# Patient Record
Sex: MALE | Race: WHITE
[De-identification: names, ages, dates, MRNs, and addresses within clinical notes are randomized per-mention and may not be internally consistent; named-entity substitution may affect disease eponyms.]

---
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
## 2019-02-15 ENCOUNTER — OUTPATIENT (OUTPATIENT)
Dept: OUTPATIENT SERVICES | Facility: HOSPITAL | Age: 19
LOS: 1 days | Discharge: HOME | End: 2019-02-15

## 2019-02-15 VITALS
HEART RATE: 98 BPM | RESPIRATION RATE: 17 BRPM | OXYGEN SATURATION: 99 % | SYSTOLIC BLOOD PRESSURE: 125 MMHG | HEIGHT: 66 IN | DIASTOLIC BLOOD PRESSURE: 75 MMHG | TEMPERATURE: 97 F | WEIGHT: 171.96 LBS

## 2019-02-15 DIAGNOSIS — Z01.818 ENCOUNTER FOR OTHER PREPROCEDURAL EXAMINATION: ICD-10-CM

## 2019-02-15 DIAGNOSIS — Z01.818 ENCOUNTER FOR OTHER PREPROCEDURAL EXAMINATION: Chronic | ICD-10-CM

## 2019-02-15 DIAGNOSIS — K02.9 DENTAL CARIES, UNSPECIFIED: ICD-10-CM

## 2019-02-15 NOTE — H&P PST ADULT - PMH
Autism  spectrum  Development delay    GERD (gastroesophageal reflux disease)    Hypothyroidism    Seizures  last 2014

## 2019-02-15 NOTE — H&P PST ADULT - NSANTHOSAYNRD_GEN_A_CORE
No. EUGENE screening performed.  STOP BANG Legend: 0-2 = LOW Risk; 3-4 = INTERMEDIATE Risk; 5-8 = HIGH Risk

## 2019-02-15 NOTE — H&P PST ADULT - REASON FOR ADMISSION
18  yr old  nonverbal autistic male. Pt accompanied with mother and father , Harper and Parish Shaikh. PT has some dental caries and needs cleaning. pt uncooperative in dental chair due to mentall disabled autistic young man. Parents have elected to complete oral rehabilitation under gerneral anesthesia with Dr. You 02/27/19

## 2019-02-15 NOTE — H&P PST ADULT - HISTORY OF PRESENT ILLNESS
18  yr old  nonverbal autistic male. Pt accompanied with mother and father , Shaina and Parish Shaikh. PT has some dental caries and needs cleaning. pt uncooperative in dental chair due to mentally disabled autistic young man. Parents have elected to complete oral rehabilitation under general anesthesia.  This young man is nonverbal, only makes grunting sounds, and arm movements. He walks independently. He has a male twin that has no developmental issues.   IT was noted after age 1 he started to regress. remained nonverbal   He is not very cooperative. Only opened mouth for a few seconds to assess  throat and dental status. He plays with a bag of pencils which keeps him busy. He is in constant motion  He hits himself in head and face intermittently  All health information was obtained from both parents and [employer] labs

## 2019-02-20 ENCOUNTER — LABORATORY RESULT (OUTPATIENT)
Age: 19
End: 2019-02-20

## 2019-02-20 ENCOUNTER — OUTPATIENT (OUTPATIENT)
Dept: OUTPATIENT SERVICES | Facility: HOSPITAL | Age: 19
LOS: 1 days | Discharge: HOME | End: 2019-02-20

## 2019-02-20 DIAGNOSIS — Z01.818 ENCOUNTER FOR OTHER PREPROCEDURAL EXAMINATION: Chronic | ICD-10-CM

## 2019-02-20 DIAGNOSIS — K21.0 GASTRO-ESOPHAGEAL REFLUX DISEASE WITH ESOPHAGITIS: ICD-10-CM

## 2019-02-20 DIAGNOSIS — F84.0 AUTISTIC DISORDER: ICD-10-CM

## 2019-02-20 DIAGNOSIS — D50.9 IRON DEFICIENCY ANEMIA, UNSPECIFIED: ICD-10-CM

## 2019-02-20 PROBLEM — E03.9 HYPOTHYROIDISM, UNSPECIFIED: Chronic | Status: ACTIVE | Noted: 2019-02-15

## 2019-02-20 PROBLEM — K21.9 GASTRO-ESOPHAGEAL REFLUX DISEASE WITHOUT ESOPHAGITIS: Chronic | Status: ACTIVE | Noted: 2019-02-15

## 2019-02-20 PROBLEM — R56.9 UNSPECIFIED CONVULSIONS: Chronic | Status: ACTIVE | Noted: 2019-02-15

## 2019-02-20 PROBLEM — R62.50 UNSPECIFIED LACK OF EXPECTED NORMAL PHYSIOLOGICAL DEVELOPMENT IN CHILDHOOD: Chronic | Status: ACTIVE | Noted: 2019-02-15

## 2019-02-27 ENCOUNTER — OUTPATIENT (OUTPATIENT)
Dept: OUTPATIENT SERVICES | Facility: HOSPITAL | Age: 19
LOS: 1 days | Discharge: HOME | End: 2019-02-27

## 2019-02-27 VITALS
HEART RATE: 88 BPM | DIASTOLIC BLOOD PRESSURE: 56 MMHG | OXYGEN SATURATION: 97 % | SYSTOLIC BLOOD PRESSURE: 106 MMHG | RESPIRATION RATE: 18 BRPM

## 2019-02-27 VITALS
SYSTOLIC BLOOD PRESSURE: 139 MMHG | WEIGHT: 171.96 LBS | OXYGEN SATURATION: 99 % | TEMPERATURE: 98 F | RESPIRATION RATE: 18 BRPM | HEIGHT: 66 IN | DIASTOLIC BLOOD PRESSURE: 76 MMHG | HEART RATE: 90 BPM

## 2019-02-27 DIAGNOSIS — Z01.818 ENCOUNTER FOR OTHER PREPROCEDURAL EXAMINATION: Chronic | ICD-10-CM

## 2019-02-27 RX ORDER — SODIUM CHLORIDE 9 MG/ML
1000 INJECTION, SOLUTION INTRAVENOUS
Qty: 0 | Refills: 0 | Status: DISCONTINUED | OUTPATIENT
Start: 2019-02-27 | End: 2019-03-14

## 2019-02-27 RX ORDER — ONDANSETRON 8 MG/1
4 TABLET, FILM COATED ORAL ONCE
Qty: 0 | Refills: 0 | Status: DISCONTINUED | OUTPATIENT
Start: 2019-02-27 | End: 2019-03-14

## 2019-02-27 RX ORDER — MORPHINE SULFATE 50 MG/1
2 CAPSULE, EXTENDED RELEASE ORAL
Qty: 0 | Refills: 0 | Status: DISCONTINUED | OUTPATIENT
Start: 2019-02-27 | End: 2019-02-27

## 2019-02-27 RX ADMIN — SODIUM CHLORIDE 100 MILLILITER(S): 9 INJECTION, SOLUTION INTRAVENOUS at 09:29

## 2019-02-27 NOTE — CHART NOTE - NSCHARTNOTEFT_GEN_A_CORE
PACU ANESTHESIA ADMISSION NOTE      Procedure: Dental rehabilitation in adult    Post op diagnosis:  Dental calculus      ____  Intubated  TV:______       Rate: ______      FiO2: ______    _x___  Patent Airway    _x___  Full return of protective reflexes    _x___  Full recovery from anesthesia / back to baseline status    Vitals:  T(C): 35.9 (02-27-19 @ 09:29), Max: 36.7 (02-27-19 @ 06:40)  HR: 88 (02-27-19 @ 11:02) (77 - 90)  BP: 107/57 (02-27-19 @ 11:02) (95/53 - 139/76)  RR: 18 (02-27-19 @ 11:02) (14 - 19)  SpO2: 96% (02-27-19 @ 11:02) (96% - 100%)    Mental Status:  _x___ Awake   _____ Alert   _____ Drowsy   _____ Sedated    Nausea/Vomiting:  _x___  NO       ______Yes,   See Post - Op Orders         Pain Scale (0-10):  __0___    Treatment: _x___ None    ____ See Post - Op/PCA Orders    Post - Operative Fluids:   __x__ Oral   ____ See Post - Op Orders    Plan: Discharge:   _x___Home       _____Floor     _____Critical Care    _____  Other:_________________    Comments:  No anesthesia issues or complications noted.  Discharge when criteria met.

## 2019-02-27 NOTE — ASU DISCHARGE PLAN (ADULT/PEDIATRIC). - ITEMS TO FOLLOWUP WITH YOUR PHYSICIAN'S
In case of emergency please call [phone] or [phone] and ask to page dental resident on call.

## 2019-02-27 NOTE — ASU DISCHARGE PLAN (ADULT/PEDIATRIC). - NOTIFY
Inability to Tolerate Liquids or Foods/Pain not relieved by Medications/Bleeding that does not stop/Fever greater than 101/Swelling that continues

## 2019-02-27 NOTE — BRIEF OPERATIVE NOTE - PROCEDURE
<<-----Click on this checkbox to enter Procedure Dental rehabilitation in adult  02/27/2019    Active  [facility]

## 2019-02-27 NOTE — PRE-ANESTHESIA EVALUATION ADULT - NSANTHOSAYNRD_GEN_A_CORE
N/A/No. EUGENE screening performed.  STOP BANG Legend: 0-2 = LOW Risk; 3-4 = INTERMEDIATE Risk; 5-8 = HIGH Risk

## 2019-03-05 DIAGNOSIS — F84.0 AUTISTIC DISORDER: ICD-10-CM

## 2019-03-05 DIAGNOSIS — K02.9 DENTAL CARIES, UNSPECIFIED: ICD-10-CM

## 2019-03-05 DIAGNOSIS — E03.9 HYPOTHYROIDISM, UNSPECIFIED: ICD-10-CM

## 2019-03-05 DIAGNOSIS — R56.9 UNSPECIFIED CONVULSIONS: ICD-10-CM

## 2019-03-05 DIAGNOSIS — R62.50 UNSPECIFIED LACK OF EXPECTED NORMAL PHYSIOLOGICAL DEVELOPMENT IN CHILDHOOD: ICD-10-CM

## 2019-03-11 ENCOUNTER — LABORATORY RESULT (OUTPATIENT)
Age: 19
End: 2019-03-11

## 2019-03-11 ENCOUNTER — APPOINTMENT (OUTPATIENT)
Dept: PEDIATRIC HEMATOLOGY/ONCOLOGY | Facility: CLINIC | Age: 19
End: 2019-03-11

## 2019-03-11 VITALS — SYSTOLIC BLOOD PRESSURE: 126 MMHG | DIASTOLIC BLOOD PRESSURE: 70 MMHG | RESPIRATION RATE: 20 BRPM | HEART RATE: 100 BPM

## 2019-03-11 VITALS — TEMPERATURE: 98.8 F

## 2019-03-11 RX ORDER — IRON SUCROSE 20 MG/ML
200 INJECTION, SOLUTION INTRAVENOUS ONCE
Qty: 0 | Refills: 0 | Status: COMPLETED | OUTPATIENT
Start: 2019-03-11 | End: 2019-03-11

## 2019-03-11 RX ADMIN — Medication 0.5 MILLIGRAM(S): at 09:50

## 2019-03-11 RX ADMIN — IRON SUCROSE 200 MILLIGRAM(S): 20 INJECTION, SOLUTION INTRAVENOUS at 11:00

## 2019-03-11 RX ADMIN — IRON SUCROSE 210 MILLIGRAM(S): 20 INJECTION, SOLUTION INTRAVENOUS at 10:00

## 2019-03-11 NOTE — REASON FOR VISIT
[Follow-Up Visit] : a follow-up visit for [Iron Deficiency Anemia] : iron deficiency anemia [Mother] : mother

## 2019-03-11 NOTE — PHYSICAL EXAM
[Pallor] : no pallor [Icterus] : not icterus [Normal] : affect appropriate [de-identified] : autistic;  [de-identified] : nonverbal

## 2019-03-12 LAB
ALBUMIN SERPL ELPH-MCNC: 4.7 G/DL
ALP BLD-CCNC: 142 U/L
ALT SERPL-CCNC: 9 U/L
ANION GAP SERPL CALC-SCNC: 13 MMOL/L
AST SERPL-CCNC: 12 U/L
BILIRUB SERPL-MCNC: 0.3 MG/DL
BUN SERPL-MCNC: 13 MG/DL
CALCIUM SERPL-MCNC: 9.9 MG/DL
CHLORIDE SERPL-SCNC: 104 MMOL/L
CO2 SERPL-SCNC: 25 MMOL/L
CREAT SERPL-MCNC: 0.6 MG/DL
ERYTHROCYTE [SEDIMENTATION RATE] IN BLOOD BY WESTERGREN METHOD: 1 MM/HR
FERRITIN SERPL-MCNC: 13 NG/ML
GLUCOSE SERPL-MCNC: 95 MG/DL
HCT VFR BLD CALC: 33.4 %
HGB BLD-MCNC: 10.1 G/DL
IRON SATN MFR SERPL: 8 %
IRON SERPL-MCNC: 37 UG/DL
MCHC RBC-ENTMCNC: 20.6 PG
MCHC RBC-ENTMCNC: 30.2 G/DL
MCV RBC AUTO: 68 FL
PLATELET # BLD AUTO: 303 K/UL
PMV BLD: 10.6 FL
POTASSIUM SERPL-SCNC: 4.3 MMOL/L
PROT SERPL-MCNC: 7.1 G/DL
RBC # BLD: 4.91 M/UL
RBC # FLD: 16.6 %
RETICS # AUTO: 2.3 %
RETICS AGGREG/RBC NFR: 113.4 K/UL
SODIUM SERPL-SCNC: 142 MMOL/L
TIBC SERPL-MCNC: 459 UG/DL
UIBC SERPL-MCNC: 422 UG/DL
WBC # FLD AUTO: 3.5 K/UL

## 2019-03-26 ENCOUNTER — LABORATORY RESULT (OUTPATIENT)
Age: 19
End: 2019-03-26

## 2019-03-26 ENCOUNTER — APPOINTMENT (OUTPATIENT)
Dept: PEDIATRIC HEMATOLOGY/ONCOLOGY | Facility: CLINIC | Age: 19
End: 2019-03-26

## 2019-03-26 VITALS
DIASTOLIC BLOOD PRESSURE: 72 MMHG | HEART RATE: 117 BPM | TEMPERATURE: 97.4 F | RESPIRATION RATE: 22 BRPM | SYSTOLIC BLOOD PRESSURE: 129 MMHG

## 2019-03-26 RX ORDER — IRON SUCROSE 20 MG/ML
200 INJECTION, SOLUTION INTRAVENOUS ONCE
Qty: 0 | Refills: 0 | Status: COMPLETED | OUTPATIENT
Start: 2019-03-26 | End: 2019-03-26

## 2019-03-26 RX ADMIN — IRON SUCROSE 210 MILLIGRAM(S): 20 INJECTION, SOLUTION INTRAVENOUS at 09:57

## 2019-03-26 RX ADMIN — Medication 0.5 MILLIGRAM(S): at 09:30

## 2019-03-26 RX ADMIN — IRON SUCROSE 200 MILLIGRAM(S): 20 INJECTION, SOLUTION INTRAVENOUS at 11:00

## 2019-03-26 NOTE — HISTORY OF PRESENT ILLNESS
[de-identified] : 17 y/o male with autism, beta thal minor, hypothyroidism, and gastritis/esophagitis.  Here for scheduled visit for Venofer infusion.  currently taking ferrous sulfate twice a day, and mother states she has started a multivitamin with iron daily.   States Cory was seen by GI recently for delayed gastric emptying and was started on Erythromycin for 15 days, and continuing Famotidine 30mg twice daily and Omeprazole 20mg in am and 40mg in pm.   Mother states since last visit with us, Cory had vomited about 4 times, no coffee ground emesis or dark colored emesis noted.  No other concerns at present time.

## 2019-03-28 LAB
HCT VFR BLD CALC: 36.6 %
HGB BLD-MCNC: 10.8 G/DL
MCHC RBC-ENTMCNC: 20.1 PG
MCHC RBC-ENTMCNC: 29.5 G/DL
MCV RBC AUTO: 68.3 FL
PLATELET # BLD AUTO: 291 K/UL
PMV BLD: 11.2 FL
RBC # BLD: 5.36 M/UL
RBC # FLD: 16.4 %
RETICS # AUTO: 1.1 %
RETICS AGGREG/RBC NFR: 56.8 K/UL
WBC # FLD AUTO: 3.74 K/UL

## 2019-04-17 ENCOUNTER — LABORATORY RESULT (OUTPATIENT)
Age: 19
End: 2019-04-17

## 2019-04-17 ENCOUNTER — OUTPATIENT (OUTPATIENT)
Dept: OUTPATIENT SERVICES | Facility: HOSPITAL | Age: 19
LOS: 1 days | Discharge: HOME | End: 2019-04-17

## 2019-04-17 DIAGNOSIS — D50.9 IRON DEFICIENCY ANEMIA, UNSPECIFIED: ICD-10-CM

## 2019-04-17 DIAGNOSIS — Z01.818 ENCOUNTER FOR OTHER PREPROCEDURAL EXAMINATION: Chronic | ICD-10-CM

## 2019-06-17 RX ORDER — LEVOTHYROXINE SODIUM 0.05 MG/1
50 TABLET ORAL
Qty: 30 | Refills: 0 | Status: DISCONTINUED | COMMUNITY
Start: 2017-06-13 | End: 2019-06-17

## 2019-06-21 ENCOUNTER — OUTPATIENT (OUTPATIENT)
Dept: OUTPATIENT SERVICES | Facility: HOSPITAL | Age: 19
LOS: 1 days | Discharge: HOME | End: 2019-06-21

## 2019-06-21 DIAGNOSIS — F84.0 AUTISTIC DISORDER: ICD-10-CM

## 2019-06-21 DIAGNOSIS — Z01.818 ENCOUNTER FOR OTHER PREPROCEDURAL EXAMINATION: Chronic | ICD-10-CM

## 2019-06-21 DIAGNOSIS — E30.0 DELAYED PUBERTY: ICD-10-CM

## 2019-06-21 DIAGNOSIS — E03.9 HYPOTHYROIDISM, UNSPECIFIED: ICD-10-CM

## 2019-06-21 DIAGNOSIS — G40.109 LOCALIZATION-RELATED (FOCAL) (PARTIAL) SYMPTOMATIC EPILEPSY AND EPILEPTIC SYNDROMES WITH SIMPLE PARTIAL SEIZURES, NOT INTRACTABLE, WITHOUT STATUS EPILEPTICUS: ICD-10-CM

## 2019-06-25 ENCOUNTER — APPOINTMENT (OUTPATIENT)
Dept: PEDIATRIC ENDOCRINOLOGY | Facility: CLINIC | Age: 19
End: 2019-06-25

## 2019-07-02 ENCOUNTER — APPOINTMENT (OUTPATIENT)
Dept: PEDIATRIC ENDOCRINOLOGY | Facility: CLINIC | Age: 19
End: 2019-07-02
Payer: COMMERCIAL

## 2019-07-02 VITALS — BODY MASS INDEX: 26.02 KG/M2 | WEIGHT: 175.71 LBS | HEIGHT: 68.9 IN

## 2019-07-02 DIAGNOSIS — Z86.39 PERSONAL HISTORY OF OTHER ENDOCRINE, NUTRITIONAL AND METABOLIC DISEASE: ICD-10-CM

## 2019-07-02 PROCEDURE — 99214 OFFICE O/P EST MOD 30 MIN: CPT

## 2019-07-02 RX ORDER — OLANZAPINE 5 MG/1
5 TABLET, FILM COATED ORAL
Qty: 60 | Refills: 0 | Status: DISCONTINUED | COMMUNITY
Start: 2017-02-13 | End: 2019-07-02

## 2019-07-02 RX ORDER — LORATADINE 5 MG
17 TABLET,CHEWABLE ORAL
Refills: 0 | Status: ACTIVE | COMMUNITY

## 2019-07-02 RX ORDER — ATOMOXETINE HYDROCHLORIDE 60 MG/1
60 CAPSULE ORAL
Qty: 30 | Refills: 0 | Status: DISCONTINUED | COMMUNITY
Start: 2016-11-01 | End: 2019-07-02

## 2019-07-02 RX ORDER — ATOMOXETINE HYDROCHLORIDE 25 MG/1
25 CAPSULE ORAL
Qty: 60 | Refills: 0 | Status: DISCONTINUED | COMMUNITY
Start: 2017-02-13 | End: 2019-07-02

## 2019-07-02 NOTE — HISTORY OF PRESENT ILLNESS
[FreeTextEntry2] : Cory is here for follow-up of primary hypothyroidism.  On current dose since 10/2017.  He is taking his T4 daily, 5 am,  from all other medications.  Constipation controlled with miralax.  Good energy.  Sleeping well, good appetite.\par \par Pubertal delay.  He is noted to have progressed.  \par \par Hx foot fracture L few years ago, parents report mention of osteopenia on xray.  Were unsuccessful doing spine xray.  Vitamin D deficiency being treated, taking daily supplements with calcium and vitamin D.  No other fractures.  No back pain.  \par \par History of recurrent hematemesis, felt due to gastritis, acid reflux, esophagitis, delayed gastric emptying.  Being followed by GI Dr. Alba. Recurrent Fe deficiency anemia. s/p Fe infusion.  Getting iron supplement. (Also has thallasemia trait)\par \par GI: gastritis,

## 2019-07-02 NOTE — DISCUSSION/SUMMARY
[FreeTextEntry1] : Cory has primary hypothyroidism on thyroid hormone replacement therapy.  Levels at this time are borderline.  We have increased dose.  Level to be reassessed in 3  months.\par \par Cory had significantly delayed puberty but subsequently had spontaneous puberty at 12y with appropriate albeit slow progression.  He is in the late stages of puberty, thus no longer of concern.\par \par Finally, there is history of xray evidence of osteopenia and also vitamin D insufficiency due to restricted diet.  He is on vitamin D supplement as well as multivitamin with calcium supplementation.  Vitamin D level has improved, to continue supplementation. They are aware of the importance of supplementation due to his very limited diet. He is further at risk for osteoporosis as a result of chronic depakote therapy.  Additionally delayed puberty can result in decreased bone density, however this is no longer an issue.  We may consider DXA scan if any fracture were to occur.

## 2019-07-02 NOTE — CONSULT LETTER
[Dear  ___] : Dear  [unfilled], [Please see my note below.] : Please see my note below. [Courtesy Letter:] : I had the pleasure of seeing your patient, [unfilled], in my office today. [Consult Closing:] : Thank you very much for allowing me to participate in the care of this patient.  If you have any questions, please do not hesitate to contact me. [Sincerely,] : Sincerely, [FreeTextEntry3] : Lexie Jackson MD\par Pediatric Endocrinology\par [facility]\par [facility]\par

## 2019-07-02 NOTE — DATA REVIEWED
[FreeTextEntry1] : Date: 11/28/2018   TSH ([facility]:[id number] quest:[id number]): 3.83 UIU/mL (Normal)    25-OH Vitamin D (quest:[id number] [facility]:[id number]): 14 ng/mL (Low)    Free T4 ([facility]:[id number] quest:[id number]): 1.0 ng/dL (Normal)    \par Date: 6/21/2019   CBC with manual diff: Hgb 9.8  (Abnormal)    25-OH Vitamin D (quest:[id number] [facility]:[id number]): 21 ng/mL (Abnormal)    TSH ([facility]:[id number] quest:[id number]): 4.02 UIU/mL (High Normal)    Free T4 ([facility]:[id number] quest:[id number]): 0.9 ng/dL (Low Normal)    \par

## 2019-07-02 NOTE — PHYSICAL EXAM
[Healthy Appearing] : healthy appearing [Well Nourished] : well nourished [Normal Appearance] : normal appearance [Normal S1 and S2] : normal S1 and S2 [Abdomen Soft] : soft [Clear to Ausculation Bilaterally] : clear to auscultation bilaterally [Abdomen Tenderness] : non-tender [] : no hepatosplenomegaly [5] : was Shaji stage 5 [Testes] : normal [___] : [unfilled] [Normal] : normal  [Dysmorphic] : non-dysmorphic [Murmur] : no murmurs [Goiter] : no goiter [de-identified] : noncommunicative [de-identified] : n

## 2019-07-15 ENCOUNTER — RX RENEWAL (OUTPATIENT)
Age: 19
End: 2019-07-15

## 2019-07-24 ENCOUNTER — OUTPATIENT (OUTPATIENT)
Dept: OUTPATIENT SERVICES | Facility: HOSPITAL | Age: 19
LOS: 1 days | Discharge: HOME | End: 2019-07-24

## 2019-07-24 ENCOUNTER — LABORATORY RESULT (OUTPATIENT)
Age: 19
End: 2019-07-24

## 2019-07-24 ENCOUNTER — APPOINTMENT (OUTPATIENT)
Dept: PEDIATRIC HEMATOLOGY/ONCOLOGY | Facility: CLINIC | Age: 19
End: 2019-07-24
Payer: COMMERCIAL

## 2019-07-24 VITALS
TEMPERATURE: 97.3 F | WEIGHT: 172 LBS | HEART RATE: 96 BPM | RESPIRATION RATE: 20 BRPM | DIASTOLIC BLOOD PRESSURE: 73 MMHG | SYSTOLIC BLOOD PRESSURE: 128 MMHG

## 2019-07-24 DIAGNOSIS — E03.9 HYPOTHYROIDISM, UNSPECIFIED: ICD-10-CM

## 2019-07-24 DIAGNOSIS — K21.0 GASTRO-ESOPHAGEAL REFLUX DISEASE WITH ESOPHAGITIS: ICD-10-CM

## 2019-07-24 DIAGNOSIS — F84.0 AUTISTIC DISORDER: ICD-10-CM

## 2019-07-24 DIAGNOSIS — D50.9 IRON DEFICIENCY ANEMIA, UNSPECIFIED: ICD-10-CM

## 2019-07-24 DIAGNOSIS — Z01.818 ENCOUNTER FOR OTHER PREPROCEDURAL EXAMINATION: Chronic | ICD-10-CM

## 2019-07-24 DIAGNOSIS — K29.71 GASTRITIS, UNSPECIFIED, WITH BLEEDING: ICD-10-CM

## 2019-07-24 LAB
HCT VFR BLD CALC: 35.7 %
HGB BLD-MCNC: 10.7 G/DL
MCHC RBC-ENTMCNC: 20.1 PG
MCHC RBC-ENTMCNC: 30 G/DL
MCV RBC AUTO: 67.1 FL
PLATELET # BLD AUTO: 293 K/UL
PMV BLD: 10 FL
RBC # BLD: 5.32 M/UL
RBC # FLD: 18.8 %
RETICS # AUTO: 1.7 %
RETICS AGGREG/RBC NFR: 89.9 K/UL
WBC # FLD AUTO: 3.54 K/UL

## 2019-07-24 PROCEDURE — 99214 OFFICE O/P EST MOD 30 MIN: CPT

## 2019-07-24 RX ORDER — IRON SUCROSE 20 MG/ML
200 INJECTION, SOLUTION INTRAVENOUS ONCE
Refills: 0 | Status: COMPLETED | OUTPATIENT
Start: 2019-07-24 | End: 2019-07-24

## 2019-07-24 RX ADMIN — IRON SUCROSE 110 MILLIGRAM(S): 20 INJECTION, SOLUTION INTRAVENOUS at 10:30

## 2019-07-24 RX ADMIN — IRON SUCROSE 200 MILLIGRAM(S): 20 INJECTION, SOLUTION INTRAVENOUS at 11:45

## 2019-07-24 RX ADMIN — Medication 0.5 MILLIGRAM(S): at 10:15

## 2019-07-24 NOTE — PHYSICAL EXAM
[Normal] : affect appropriate [Icterus] : not icterus [Pallor] : no pallor [de-identified] : autistic;  [de-identified] : fe scratches on left upper chest/shoulder [de-identified] : nonverbal

## 2019-07-24 NOTE — HISTORY OF PRESENT ILLNESS
[de-identified] : 19 yo male with autism, beta thal minor, hypothyroidism, and gastritis/esophagitis. He continues on ferrous sulfate bid over the last ~6 months and PPI/H2 blocker from GI.  He has had multiple episodes of emesis since last visit with at least 5 episodes of coffee ground emesis recently.  Mother believes its related to less control over diet and eating later at night over the summer due to change in schedule and being off from school.  Mother trying to keep him adhering to his usual routine.\par \par Labs checked oupt last month and Ferritin last month had trended down to 15.  Hgb then was 9.8.  Returns today for re-evalution and possible venofer. [de-identified] : good energ, no recent fevers or illnesses

## 2019-07-24 NOTE — REVIEW OF SYSTEMS
[Bleeding] : bleeding [Anemia] : anemia [Emesis] : emesis [Hematemesis] : hematemesis [Negative] : Allergic/Immunologic

## 2019-07-31 ENCOUNTER — APPOINTMENT (OUTPATIENT)
Dept: PEDIATRIC HEMATOLOGY/ONCOLOGY | Facility: CLINIC | Age: 19
End: 2019-07-31
Payer: COMMERCIAL

## 2019-07-31 ENCOUNTER — LABORATORY RESULT (OUTPATIENT)
Age: 19
End: 2019-07-31

## 2019-07-31 VITALS
SYSTOLIC BLOOD PRESSURE: 134 MMHG | TEMPERATURE: 98.6 F | DIASTOLIC BLOOD PRESSURE: 69 MMHG | RESPIRATION RATE: 20 BRPM | HEART RATE: 107 BPM

## 2019-07-31 LAB
HCT VFR BLD CALC: 31.8 %
HGB BLD-MCNC: 9.5 G/DL
MCHC RBC-ENTMCNC: 20.5 PG
MCHC RBC-ENTMCNC: 29.9 G/DL
MCV RBC AUTO: 68.5 FL
PLATELET # BLD AUTO: 220 K/UL
PMV BLD: 10.5 FL
RBC # BLD: 4.64 M/UL
RBC # FLD: 18.7 %
RETICS # AUTO: 1.9 %
RETICS AGGREG/RBC NFR: 86.8 K/UL
WBC # FLD AUTO: 3.93 K/UL

## 2019-07-31 PROCEDURE — 99214 OFFICE O/P EST MOD 30 MIN: CPT

## 2019-07-31 RX ORDER — IRON SUCROSE 20 MG/ML
200 INJECTION, SOLUTION INTRAVENOUS ONCE
Refills: 0 | Status: COMPLETED | OUTPATIENT
Start: 2019-07-31 | End: 2019-07-31

## 2019-07-31 RX ADMIN — IRON SUCROSE 210 MILLIGRAM(S): 20 INJECTION, SOLUTION INTRAVENOUS at 09:30

## 2019-07-31 RX ADMIN — IRON SUCROSE 200 MILLIGRAM(S): 20 INJECTION, SOLUTION INTRAVENOUS at 10:45

## 2019-07-31 RX ADMIN — Medication 0.5 MILLIGRAM(S): at 09:20

## 2019-07-31 NOTE — HISTORY OF PRESENT ILLNESS
[de-identified] : This is a 20 y/o male with autism, beta thal minor, hypothyroidism, gastritis/esophagitis.  Here for scheduled visit for Venofer infusion.  Mother states that Cory has been doing well since visit for venofer infusion  last week.  Did have 3 episodes of clear colored emesis this past week.  Otherwise, no other issues at present time.  Denies any recent fever, cough or cold symptoms.  No unusual bruising or bleeding.   Patient has been compliant with iron supplements.  Taking Ompeprazole/Famotidine daily.   Scheduled to see GI in August.

## 2019-07-31 NOTE — REVIEW OF SYSTEMS
[Normal Appetite] : normal appetite [Emesis] : emesis [Negative] : Psychiatric [Fever] : no fever [Rash] : no rash [Fatigue] : no fatigue [Ecchymoses] : no ecchymoses [Petechiae] : no petechiae [Jaundice] : no jaundice [Icterus] : no icterus [Epistaxis] : no epistaxis [Eye Discharge] : no eye discharge [Mouth Ulcers] : no mouth ulcers [Bleeding] : no bleeding [Pallor] : no pallor [Adenopathy] : no adenopathy [Bruising] : no bruising [Dyspnea] : no dyspnea [Cough] : no cough [Palpitations] : no palpitations [Murmur] : no murmur [Hematuria] : no hematuria [Hematemesis] : no hematemesis [Erythema] : no erythema [Joint Swelling] : no joint swelling [Seizure] : no seizure

## 2019-08-01 LAB
ALBUMIN SERPL ELPH-MCNC: 4.6 G/DL
ALP BLD-CCNC: 142 U/L
ALT SERPL-CCNC: 14 U/L
ANION GAP SERPL CALC-SCNC: 16 MMOL/L
AST SERPL-CCNC: 26 U/L
BILIRUB SERPL-MCNC: 0.3 MG/DL
BUN SERPL-MCNC: 12 MG/DL
CALCIUM SERPL-MCNC: 10.2 MG/DL
CHLORIDE SERPL-SCNC: 102 MMOL/L
CO2 SERPL-SCNC: 23 MMOL/L
CREAT SERPL-MCNC: 0.8 MG/DL
ERYTHROCYTE [SEDIMENTATION RATE] IN BLOOD BY WESTERGREN METHOD: 1 MM/HR
FERRITIN SERPL-MCNC: 22 NG/ML
GLUCOSE SERPL-MCNC: 82 MG/DL
IRON SATN MFR SERPL: 19 %
IRON SERPL-MCNC: 89 UG/DL
POTASSIUM SERPL-SCNC: 5.1 MMOL/L
PROT SERPL-MCNC: 7.5 G/DL
SODIUM SERPL-SCNC: 141 MMOL/L
STFR SERPL-MCNC: 6 MG/L
TIBC SERPL-MCNC: 476 UG/DL
UIBC SERPL-MCNC: 387 UG/DL

## 2019-08-13 ENCOUNTER — APPOINTMENT (OUTPATIENT)
Dept: PEDIATRIC HEMATOLOGY/ONCOLOGY | Facility: CLINIC | Age: 19
End: 2019-08-13
Payer: COMMERCIAL

## 2019-08-13 ENCOUNTER — LABORATORY RESULT (OUTPATIENT)
Age: 19
End: 2019-08-13

## 2019-08-13 VITALS
SYSTOLIC BLOOD PRESSURE: 134 MMHG | HEART RATE: 100 BPM | DIASTOLIC BLOOD PRESSURE: 68 MMHG | RESPIRATION RATE: 20 BRPM | TEMPERATURE: 97.4 F

## 2019-08-13 PROCEDURE — 96365 THER/PROPH/DIAG IV INF INIT: CPT

## 2019-08-13 RX ORDER — IRON SUCROSE 20 MG/ML
200 INJECTION, SOLUTION INTRAVENOUS ONCE
Refills: 0 | Status: COMPLETED | OUTPATIENT
Start: 2019-08-13 | End: 2019-08-13

## 2019-08-13 RX ADMIN — IRON SUCROSE 200 MILLIGRAM(S): 20 INJECTION, SOLUTION INTRAVENOUS at 10:45

## 2019-08-13 RX ADMIN — IRON SUCROSE 210 MILLIGRAM(S): 20 INJECTION, SOLUTION INTRAVENOUS at 09:37

## 2019-08-13 RX ADMIN — Medication 0.5 MILLIGRAM(S): at 09:25

## 2019-08-13 NOTE — REVIEW OF SYSTEMS
[Normal Appetite] : normal appetite [Emesis] : emesis [Negative] : Psychiatric [Fever] : no fever [Fatigue] : no fatigue [Rash] : no rash [Petechiae] : no petechiae [Ecchymoses] : no ecchymoses [Jaundice] : no jaundice [Icterus] : no icterus [Otitis Media] : no otitis media [Nasal Discharge] : no nasal discharge [Sore Throat] : no sore throat [Pallor] : no pallor [Bleeding] : no bleeding [Bruising] : no bruising [Adenopathy] : no adenopathy [Cough] : no cough [Abdominal Pain] : no abdominal pain [Nausea] : no nausea [Hematuria] : no hematuria [Hematemesis] : no hematemesis [Constipation] : no constipation [Headache] : no headache

## 2019-08-13 NOTE — HISTORY OF PRESENT ILLNESS
[de-identified] : This is a 18 y/o male with autism, iron deficiency anemia, beta thal minor, hypothyroidism, gastritis/esophagitis.  Here today for scheduled Venofer infusion.  Mother states that Cory has been doing well since last visit.  Did have 4-5 episodes of clear colored emesis.  Otherwise no other issues at present time.  Denies any recent cold symptoms, no fever.  Patient compliant with daily iron supplements.  Taking Omeprazole/Famotidine daily.  Patient to be scheduled for follow up visit with GI at the end of august.\par

## 2019-08-15 LAB
FERRITIN SERPL-MCNC: 45 NG/ML
HCT VFR BLD CALC: 34.2 %
HGB BLD-MCNC: 10.1 G/DL
MCHC RBC-ENTMCNC: 20.3 PG
MCHC RBC-ENTMCNC: 29.5 G/DL
MCV RBC AUTO: 68.7 FL
PLATELET # BLD AUTO: 240 K/UL
PMV BLD: 10.1 FL
RBC # BLD: 4.98 M/UL
RBC # FLD: 17.6 %
WBC # FLD AUTO: 2.93 K/UL

## 2019-08-20 ENCOUNTER — LABORATORY RESULT (OUTPATIENT)
Age: 19
End: 2019-08-20

## 2019-08-20 ENCOUNTER — APPOINTMENT (OUTPATIENT)
Dept: PEDIATRIC HEMATOLOGY/ONCOLOGY | Facility: CLINIC | Age: 19
End: 2019-08-20
Payer: COMMERCIAL

## 2019-08-20 VITALS
SYSTOLIC BLOOD PRESSURE: 104 MMHG | HEART RATE: 104 BPM | DIASTOLIC BLOOD PRESSURE: 62 MMHG | TEMPERATURE: 97.8 F | RESPIRATION RATE: 22 BRPM

## 2019-08-20 PROCEDURE — 99213 OFFICE O/P EST LOW 20 MIN: CPT

## 2019-08-20 RX ORDER — IRON SUCROSE 20 MG/ML
200 INJECTION, SOLUTION INTRAVENOUS ONCE
Refills: 0 | Status: COMPLETED | OUTPATIENT
Start: 2019-08-20 | End: 2019-08-20

## 2019-08-20 RX ADMIN — Medication 0.5 MILLIGRAM(S): at 09:30

## 2019-08-20 RX ADMIN — IRON SUCROSE 200 MILLIGRAM(S): 20 INJECTION, SOLUTION INTRAVENOUS at 11:15

## 2019-08-20 RX ADMIN — IRON SUCROSE 260 MILLIGRAM(S): 20 INJECTION, SOLUTION INTRAVENOUS at 09:50

## 2019-08-29 ENCOUNTER — APPOINTMENT (OUTPATIENT)
Dept: PEDIATRIC HEMATOLOGY/ONCOLOGY | Facility: CLINIC | Age: 19
End: 2019-08-29

## 2019-08-30 NOTE — REVIEW OF SYSTEMS
[Normal Appetite] : normal appetite [Cough] : cough [Negative] : Psychiatric [Fever] : no fever [Fatigue] : no fatigue [Rash] : no rash [Petechiae] : no petechiae [Ecchymoses] : no ecchymoses [Icterus] : no icterus [Jaundice] : no jaundice [Nasal Discharge] : no nasal discharge [Pallor] : no pallor [Bleeding] : no bleeding [Bruising] : no bruising [Adenopathy] : no adenopathy [Frequent Infections] : no frequent infections [Dyspnea] : no dyspnea [Wheezing] : no wheezing [Murmur] : no murmur [Chest Pain] : no chest paint [Palpitations] : no palpitations [Abdominal Pain] : no abdominal pain [Cyanosis] : no cyanosis [Emesis] : no emesis [Hematemesis] : no hematemesis [Constipation] : no constipation [Diarrhea] : no diarrhea [Bone Pain] : no bone pain [Joint Pain] : no joint pain

## 2019-08-30 NOTE — HISTORY OF PRESENT ILLNESS
[No Feeding Issues] : no feeding issues at this time [de-identified] : 20 y/o male with autism, iron deficiency anemia, beta thal minor, hypothyroidism, gastritis/esophagitis.  Here today for Venofer infusion #4.  As per mom, Cory has been doing well.  States had 3 episodes of clear colored emesis.  No other unusual bleeding or bruising.  Denies fever, cough or cold symptoms.  Patient compliant with daily iron supplements.  Taking omeprazole/famotidine daily. Plan for follow up with GI at the end of august.  \par \par Plan for venofer infusions to achieve goal of ferritin 50\par \par Ferritin after 3rd infusion - 45

## 2019-09-05 LAB
FERRITIN SERPL-MCNC: 89 NG/ML
HCT VFR BLD CALC: 33.8 %
HGB BLD-MCNC: 9.9 G/DL
MCHC RBC-ENTMCNC: 20.4 PG
MCHC RBC-ENTMCNC: 29.3 G/DL
MCV RBC AUTO: 69.5 FL
PLATELET # BLD AUTO: 264 K/UL
PMV BLD: 10.7 FL
RBC # BLD: 4.86 M/UL
RBC # FLD: 17.6 %
WBC # FLD AUTO: 3.21 K/UL

## 2019-10-15 ENCOUNTER — LABORATORY RESULT (OUTPATIENT)
Age: 19
End: 2019-10-15

## 2019-10-15 ENCOUNTER — OUTPATIENT (OUTPATIENT)
Dept: OUTPATIENT SERVICES | Facility: HOSPITAL | Age: 19
LOS: 1 days | Discharge: HOME | End: 2019-10-15

## 2019-10-15 DIAGNOSIS — Z01.818 ENCOUNTER FOR OTHER PREPROCEDURAL EXAMINATION: Chronic | ICD-10-CM

## 2019-10-15 DIAGNOSIS — Z00.129 ENCOUNTER FOR ROUTINE CHILD HEALTH EXAMINATION WITHOUT ABNORMAL FINDINGS: ICD-10-CM

## 2019-10-21 DIAGNOSIS — Z00.00 ENCOUNTER FOR GENERAL ADULT MEDICAL EXAMINATION WITHOUT ABNORMAL FINDINGS: ICD-10-CM

## 2019-10-22 ENCOUNTER — APPOINTMENT (OUTPATIENT)
Dept: PEDIATRIC HEMATOLOGY/ONCOLOGY | Facility: CLINIC | Age: 19
End: 2019-10-22
Payer: COMMERCIAL

## 2019-10-22 ENCOUNTER — LABORATORY RESULT (OUTPATIENT)
Age: 19
End: 2019-10-22

## 2019-10-22 VITALS
DIASTOLIC BLOOD PRESSURE: 66 MMHG | RESPIRATION RATE: 22 BRPM | HEART RATE: 109 BPM | SYSTOLIC BLOOD PRESSURE: 128 MMHG | TEMPERATURE: 97.3 F

## 2019-10-22 LAB
HCT VFR BLD CALC: 30.7 %
HGB BLD-MCNC: 9.2 G/DL
MCHC RBC-ENTMCNC: 20.3 PG
MCHC RBC-ENTMCNC: 30 G/DL
MCV RBC AUTO: 67.8 FL
PLATELET # BLD AUTO: 197 K/UL
PMV BLD: 10.5 FL
RBC # BLD: 4.53 M/UL
RBC # FLD: 15.8 %
RETICS # AUTO: 1.2 %
RETICS AGGREG/RBC NFR: 55.3 K/UL
WBC # FLD AUTO: 2.79 K/UL

## 2019-10-22 PROCEDURE — 99214 OFFICE O/P EST MOD 30 MIN: CPT

## 2019-10-22 RX ORDER — IRON SUCROSE 20 MG/ML
200 INJECTION, SOLUTION INTRAVENOUS ONCE
Refills: 0 | Status: COMPLETED | OUTPATIENT
Start: 2019-10-22 | End: 2019-10-22

## 2019-10-22 RX ADMIN — IRON SUCROSE 210 MILLIGRAM(S): 20 INJECTION, SOLUTION INTRAVENOUS at 09:30

## 2019-10-22 RX ADMIN — IRON SUCROSE 200 MILLIGRAM(S): 20 INJECTION, SOLUTION INTRAVENOUS at 11:00

## 2019-10-22 RX ADMIN — Medication 0.5 MILLIGRAM(S): at 09:20

## 2019-10-22 NOTE — PHYSICAL EXAM
[Pallor] : no pallor [Icterus] : not icterus [Normal] : affect appropriate [de-identified] : nonverbal [de-identified] : autistic;

## 2019-10-22 NOTE — HISTORY OF PRESENT ILLNESS
[de-identified] : 18 y/o male with autism, iron deficiency anemia, beta thal minor, hypothyroidism, gastritis/esophagitis.  \par \par GI: Had several episodes of dark coffee ground emesis since last visit and several more episodes of non-dark/bloody emesis. emesis seems to be related to eaing dinner late at [employer] and poor diet choices (more junk food, less bland diet)\par Poor diet- difficult to comply with bland diet.  Intermittently taking po iron.  Dark stools. Submitted sample to GI and will f/u with GI for possible endoscopy/colonoscopy.  Continues on a PPI and H2 blocker as per GI.\par \par Iron def: Poor diet.  Hgb dropped to 8.8 outpt earlier this month.  Ferritin dropped to 11, ESR normal. No recent fevers, cough, cold or URI illnesses.\par \par Autism: stable, in special school program\par \par hypothyroidism and vit D def: Follows with endocrine, continues on supplementation\par \par Leukopenia: Labs earlier this month with normal ANC, no frequent infections or illnesses\par \par

## 2019-10-25 ENCOUNTER — RX RENEWAL (OUTPATIENT)
Age: 19
End: 2019-10-25

## 2019-11-04 ENCOUNTER — APPOINTMENT (OUTPATIENT)
Dept: PEDIATRIC HEMATOLOGY/ONCOLOGY | Facility: CLINIC | Age: 19
End: 2019-11-04
Payer: COMMERCIAL

## 2019-11-04 ENCOUNTER — LABORATORY RESULT (OUTPATIENT)
Age: 19
End: 2019-11-04

## 2019-11-04 VITALS
SYSTOLIC BLOOD PRESSURE: 121 MMHG | HEART RATE: 100 BPM | TEMPERATURE: 97.3 F | RESPIRATION RATE: 22 BRPM | DIASTOLIC BLOOD PRESSURE: 79 MMHG

## 2019-11-04 PROCEDURE — 99213 OFFICE O/P EST LOW 20 MIN: CPT

## 2019-11-04 RX ORDER — IRON SUCROSE 20 MG/ML
200 INJECTION, SOLUTION INTRAVENOUS ONCE
Refills: 0 | Status: COMPLETED | OUTPATIENT
Start: 2019-11-04 | End: 2019-11-04

## 2019-11-04 RX ADMIN — Medication 0.5 MILLIGRAM(S): at 09:45

## 2019-11-04 RX ADMIN — IRON SUCROSE 200 MILLIGRAM(S): 20 INJECTION, SOLUTION INTRAVENOUS at 10:58

## 2019-11-04 RX ADMIN — IRON SUCROSE 210 MILLIGRAM(S): 20 INJECTION, SOLUTION INTRAVENOUS at 09:58

## 2019-11-04 NOTE — HISTORY OF PRESENT ILLNESS
[de-identified] : This is a scheduled follow up for this 18 y/o male with autism, iron deficiency anemia, beta thal minor, hypothyroidism, gastritis and esophagitis.  Here today for Venofer infusion (dose #2).  Mother of patient states that since last venofer infusion patient has vomited several times per day, at random times during the day.  Emesis has been clear colored.  Results of stool specimen sent by GI positive for blood.  Scheduled for endoscopy next week.   Continues H2 blocker and PPI as per GI.  Mother states she has tried to avoid foods that she feels patient may be more likely not to tolerate (ie: fast foods, certain crackers).  \par \par Otherwise denies fever, cough or congestion.  No abdominal pain.  Has had one bowel movement in the past week.  Mother has added Metamucil to diet to help improve stool pattern.  She has also noted that he has had increased in fluid intake at night and noted vomiting episodes would increase afterwards.

## 2019-11-04 NOTE — PHYSICAL EXAM
[Normal] : normal appearance, no rash, nodules, vesicles, ulcers, erythema [de-identified] : nonverbal

## 2019-11-04 NOTE — REVIEW OF SYSTEMS
[Pallor] : pallor [Fever] : no fever [Fatigue] : no fatigue [Rash] : no rash [Icterus] : no icterus [Nasal Discharge] : no nasal discharge [Sore Throat] : no sore throat [Mouth Ulcers] : no mouth ulcers [Bleeding] : no bleeding [Bruising] : no bruising [Adenopathy] : no adenopathy [Frequent Infections] : no frequent infections [Dyspnea] : no dyspnea [Cough] : no cough [Murmur] : no murmur [Chest Pain] : no chest paint [Abdominal Pain] : no abdominal pain [Nausea] : no nausea [Diarrhea] : no diarrhea [Dysuria] : no dysuria [Hematuria] : no hematuria [Joint Pain] : no joint pain [Joint Swelling] : no joint swelling [Irritable] : not irritable

## 2019-11-05 LAB
HCT VFR BLD CALC: 32 %
HGB BLD-MCNC: 9.6 G/DL
MCHC RBC-ENTMCNC: 20.6 PG
MCHC RBC-ENTMCNC: 30 G/DL
MCV RBC AUTO: 68.7 FL
PLATELET # BLD AUTO: 245 K/UL
PMV BLD: 10.5 FL
RBC # BLD: 4.66 M/UL
RBC # FLD: 16.1 %
RETICS # AUTO: 1.4 %
RETICS AGGREG/RBC NFR: 63.4 K/UL
WBC # FLD AUTO: 2.86 K/UL

## 2019-11-11 ENCOUNTER — APPOINTMENT (OUTPATIENT)
Dept: PEDIATRIC HEMATOLOGY/ONCOLOGY | Facility: CLINIC | Age: 19
End: 2019-11-11
Payer: COMMERCIAL

## 2019-11-11 VITALS
RESPIRATION RATE: 22 BRPM | DIASTOLIC BLOOD PRESSURE: 79 MMHG | HEART RATE: 108 BPM | TEMPERATURE: 97 F | SYSTOLIC BLOOD PRESSURE: 175 MMHG

## 2019-11-11 PROCEDURE — 99213 OFFICE O/P EST LOW 20 MIN: CPT

## 2019-11-11 RX ORDER — IRON SUCROSE 20 MG/ML
200 INJECTION, SOLUTION INTRAVENOUS ONCE
Refills: 0 | Status: COMPLETED | OUTPATIENT
Start: 2019-11-11 | End: 2019-11-11

## 2019-11-11 RX ADMIN — Medication 0.5 MILLIGRAM(S): at 09:50

## 2019-11-11 RX ADMIN — IRON SUCROSE 210 MILLIGRAM(S): 20 INJECTION, SOLUTION INTRAVENOUS at 10:00

## 2019-11-11 RX ADMIN — IRON SUCROSE 200 MILLIGRAM(S): 20 INJECTION, SOLUTION INTRAVENOUS at 11:00

## 2019-11-11 NOTE — PHYSICAL EXAM
[Pallor] : no pallor [Icterus] : not icterus [Normal] : normal appearance, no rash, nodules, vesicles, ulcers, erythema [de-identified] : autistic;  [de-identified] : nonverbal

## 2019-11-11 NOTE — HISTORY OF PRESENT ILLNESS
[de-identified] : 18 yo with autism, iron def anemia, gastritis/esophagitis on PPI and H2 blocker, beta thal trait here for venofer dose #3.  Doing well.  Eating better- trying to avoid fast food. no vomiting since last visit here.  Has appt with GI tomorrow for endoscopy.

## 2019-11-20 ENCOUNTER — APPOINTMENT (OUTPATIENT)
Dept: PEDIATRIC HEMATOLOGY/ONCOLOGY | Facility: CLINIC | Age: 19
End: 2019-11-20
Payer: COMMERCIAL

## 2019-11-20 ENCOUNTER — LABORATORY RESULT (OUTPATIENT)
Age: 19
End: 2019-11-20

## 2019-11-20 VITALS
DIASTOLIC BLOOD PRESSURE: 77 MMHG | HEART RATE: 106 BPM | RESPIRATION RATE: 20 BRPM | TEMPERATURE: 97.6 F | SYSTOLIC BLOOD PRESSURE: 122 MMHG

## 2019-11-20 PROCEDURE — 99213 OFFICE O/P EST LOW 20 MIN: CPT

## 2019-11-20 RX ORDER — IRON SUCROSE 20 MG/ML
200 INJECTION, SOLUTION INTRAVENOUS ONCE
Refills: 0 | Status: COMPLETED | OUTPATIENT
Start: 2019-11-20 | End: 2019-11-20

## 2019-11-20 RX ADMIN — Medication 0.5 MILLIGRAM(S): at 09:20

## 2019-11-20 RX ADMIN — IRON SUCROSE 110 MILLIGRAM(S): 20 INJECTION, SOLUTION INTRAVENOUS at 09:40

## 2019-11-20 RX ADMIN — IRON SUCROSE 200 MILLIGRAM(S): 20 INJECTION, SOLUTION INTRAVENOUS at 10:41

## 2019-11-20 NOTE — HISTORY OF PRESENT ILLNESS
[de-identified] : 18 yo with autism, iron def anemia, esophagitis/gastritis, beta thal trait, hypothryopidsim, here for f/u.\par  [de-identified] : Had EGD since last visit- polyps seen. + gastritis.  Polyps sent for bx.  Omeprazole decreased to daily by GI.  Started on erythromycin for delayed gastric emptying.\par Still vomiting periodically- was brown for a few days post EGD.  Trying to adhere to bland diet as much as possible.

## 2019-11-20 NOTE — PHYSICAL EXAM
[Pallor] : no pallor [Icterus] : not icterus [Normal] : normal appearance, no rash, nodules, vesicles, ulcers, erythema [de-identified] : autistic;  [de-identified] : nonverbal

## 2019-12-03 ENCOUNTER — APPOINTMENT (OUTPATIENT)
Dept: PEDIATRIC HEMATOLOGY/ONCOLOGY | Facility: CLINIC | Age: 19
End: 2019-12-03
Payer: COMMERCIAL

## 2019-12-03 ENCOUNTER — LABORATORY RESULT (OUTPATIENT)
Age: 19
End: 2019-12-03

## 2019-12-03 VITALS
HEART RATE: 90 BPM | TEMPERATURE: 97.3 F | RESPIRATION RATE: 18 BRPM | DIASTOLIC BLOOD PRESSURE: 58 MMHG | SYSTOLIC BLOOD PRESSURE: 111 MMHG

## 2019-12-03 PROCEDURE — 99214 OFFICE O/P EST MOD 30 MIN: CPT

## 2019-12-03 RX ORDER — IRON SUCROSE 20 MG/ML
200 INJECTION, SOLUTION INTRAVENOUS ONCE
Refills: 0 | Status: COMPLETED | OUTPATIENT
Start: 2019-12-03 | End: 2019-12-03

## 2019-12-03 RX ADMIN — IRON SUCROSE 110 MILLIGRAM(S): 20 INJECTION, SOLUTION INTRAVENOUS at 10:05

## 2019-12-03 RX ADMIN — IRON SUCROSE 200 MILLIGRAM(S): 20 INJECTION, SOLUTION INTRAVENOUS at 11:05

## 2019-12-03 RX ADMIN — Medication 0.5 MILLIGRAM(S): at 10:00

## 2019-12-04 LAB
HCT VFR BLD CALC: 33 %
HCT VFR BLD CALC: 35.4 %
HGB BLD-MCNC: 10.7 G/DL
HGB BLD-MCNC: 9.9 G/DL
MCHC RBC-ENTMCNC: 20.6 PG
MCHC RBC-ENTMCNC: 20.8 PG
MCHC RBC-ENTMCNC: 30 G/DL
MCHC RBC-ENTMCNC: 30.2 G/DL
MCV RBC AUTO: 68.7 FL
MCV RBC AUTO: 68.8 FL
PLATELET # BLD AUTO: 209 K/UL
PLATELET # BLD AUTO: 321 K/UL
PMV BLD: 10.3 FL
PMV BLD: 10.6 FL
RBC # BLD: 4.8 M/UL
RBC # BLD: 5.15 M/UL
RBC # FLD: 18.1 %
RBC # FLD: 18.2 %
RETICS # AUTO: 0.8 %
RETICS # AUTO: 2.3 %
RETICS AGGREG/RBC NFR: 112.3 K/UL
RETICS AGGREG/RBC NFR: 40.7 K/UL
WBC # FLD AUTO: 3.17 K/UL
WBC # FLD AUTO: 3.48 K/UL

## 2019-12-04 NOTE — HISTORY OF PRESENT ILLNESS
[de-identified] : 20 yo with autism, iron def anemia, esophagitis/gastritis, beta thal trait, hypothyroidism, here for f/u and venofer dose #5.\par  [de-identified] : Continues on erythromycin from GI, and H2 blocker and PPI.  Was sick last week with a URI- self-resolved.  \par \par no vomiting this week.  Eating less and mother reports better control of eating junk food. no blood in stool noted.

## 2019-12-04 NOTE — PHYSICAL EXAM
[Pallor] : no pallor [Icterus] : not icterus [de-identified] : autistic;  [Normal] : affect appropriate [de-identified] : nonverbal

## 2019-12-10 ENCOUNTER — OUTPATIENT (OUTPATIENT)
Dept: OUTPATIENT SERVICES | Facility: HOSPITAL | Age: 19
LOS: 1 days | Discharge: HOME | End: 2019-12-10

## 2019-12-10 ENCOUNTER — APPOINTMENT (OUTPATIENT)
Dept: PEDIATRIC HEMATOLOGY/ONCOLOGY | Facility: CLINIC | Age: 19
End: 2019-12-10
Payer: COMMERCIAL

## 2019-12-10 ENCOUNTER — LABORATORY RESULT (OUTPATIENT)
Age: 19
End: 2019-12-10

## 2019-12-10 VITALS
SYSTOLIC BLOOD PRESSURE: 124 MMHG | DIASTOLIC BLOOD PRESSURE: 63 MMHG | TEMPERATURE: 97.6 F | RESPIRATION RATE: 20 BRPM | HEART RATE: 100 BPM

## 2019-12-10 DIAGNOSIS — D72.819 DECREASED WHITE BLOOD CELL COUNT, UNSPECIFIED: ICD-10-CM

## 2019-12-10 DIAGNOSIS — D56.3 THALASSEMIA MINOR: ICD-10-CM

## 2019-12-10 DIAGNOSIS — D50.9 IRON DEFICIENCY ANEMIA, UNSPECIFIED: ICD-10-CM

## 2019-12-10 DIAGNOSIS — E03.9 HYPOTHYROIDISM, UNSPECIFIED: ICD-10-CM

## 2019-12-10 DIAGNOSIS — Z01.818 ENCOUNTER FOR OTHER PREPROCEDURAL EXAMINATION: Chronic | ICD-10-CM

## 2019-12-10 DIAGNOSIS — F84.0 AUTISTIC DISORDER: ICD-10-CM

## 2019-12-10 DIAGNOSIS — K20.9 ESOPHAGITIS, UNSPECIFIED: ICD-10-CM

## 2019-12-10 DIAGNOSIS — K29.70 GASTRITIS, UNSPECIFIED, WITHOUT BLEEDING: ICD-10-CM

## 2019-12-10 LAB
ALBUMIN SERPL ELPH-MCNC: 4.2 G/DL
ALP BLD-CCNC: 69 U/L
ALT SERPL-CCNC: 6 U/L
ANION GAP SERPL CALC-SCNC: 14 MMOL/L
AST SERPL-CCNC: 12 U/L
BILIRUB SERPL-MCNC: 0.2 MG/DL
BUN SERPL-MCNC: 14 MG/DL
CALCIUM SERPL-MCNC: 9.5 MG/DL
CHLORIDE SERPL-SCNC: 102 MMOL/L
CO2 SERPL-SCNC: 26 MMOL/L
CREAT SERPL-MCNC: 0.7 MG/DL
GLUCOSE SERPL-MCNC: 94 MG/DL
HCT VFR BLD CALC: 35.4 %
HGB BLD-MCNC: 10.6 G/DL
MCHC RBC-ENTMCNC: 20.3 PG
MCHC RBC-ENTMCNC: 29.9 G/DL
MCV RBC AUTO: 67.9 FL
PLATELET # BLD AUTO: 256 K/UL
PMV BLD: 9.6 FL
POTASSIUM SERPL-SCNC: 4.2 MMOL/L
PROT SERPL-MCNC: 6.7 G/DL
RBC # BLD: 5.21 M/UL
RBC # FLD: 18.6 %
SODIUM SERPL-SCNC: 142 MMOL/L
WBC # FLD AUTO: 6.98 K/UL

## 2019-12-10 PROCEDURE — 99214 OFFICE O/P EST MOD 30 MIN: CPT

## 2019-12-10 RX ORDER — IRON SUCROSE 20 MG/ML
300 INJECTION, SOLUTION INTRAVENOUS ONCE
Refills: 0 | Status: COMPLETED | OUTPATIENT
Start: 2019-12-10 | End: 2019-12-10

## 2019-12-10 RX ADMIN — IRON SUCROSE 315 MILLIGRAM(S): 20 INJECTION, SOLUTION INTRAVENOUS at 10:30

## 2019-12-10 RX ADMIN — Medication 0.5 MILLIGRAM(S): at 10:20

## 2019-12-10 RX ADMIN — IRON SUCROSE 300 MILLIGRAM(S): 20 INJECTION, SOLUTION INTRAVENOUS at 11:40

## 2019-12-10 NOTE — HISTORY OF PRESENT ILLNESS
[de-identified] : Had a few episodes of vomiting at night since last visit- no blood in emesis and no dark emesis noted.  \par Taking erythromycin for delayed gastric emptying as per GI, and continues on PPI + H2 blocker.\par \par Tolerating venofer. Hgb improved.   [de-identified] : 20 yo with autism, iron def anemia, esophagitis/gastritis, beta thal trait, hypothyroidism, here for f/u and venofer.  \par

## 2019-12-10 NOTE — PHYSICAL EXAM
[Normal] : affect appropriate [Pallor] : no pallor [Icterus] : not icterus [de-identified] : autistic;  [de-identified] : nonverbal

## 2019-12-10 NOTE — CONSULT LETTER
[Dear  ___] : Dear  [unfilled], [Courtesy Letter:] : I had the pleasure of seeing your patient, [unfilled], in my office today. [Please see my note below.] : Please see my note below. [Consult Closing:] : Thank you very much for allowing me to participate in the care of this patient.  If you have any questions, please do not hesitate to contact me. [Sincerely,] : Sincerely, [FreeTextEntry2] : Dr Lin [FreeTextEntry3] : Carolina Espinal MD\par Pediatric Hematology/Oncology\par [facility]\par [address] [place] [address]\[place] [address]\par \par

## 2019-12-19 ENCOUNTER — RX RENEWAL (OUTPATIENT)
Age: 19
End: 2019-12-19

## 2019-12-28 ENCOUNTER — EMERGENCY (EMERGENCY)
Facility: HOSPITAL | Age: 19
LOS: 0 days | Discharge: HOME | End: 2019-12-29
Attending: EMERGENCY MEDICINE | Admitting: EMERGENCY MEDICINE
Payer: COMMERCIAL

## 2019-12-28 VITALS
TEMPERATURE: 98 F | OXYGEN SATURATION: 100 % | SYSTOLIC BLOOD PRESSURE: 140 MMHG | RESPIRATION RATE: 18 BRPM | HEART RATE: 121 BPM | DIASTOLIC BLOOD PRESSURE: 74 MMHG

## 2019-12-28 DIAGNOSIS — R10.9 UNSPECIFIED ABDOMINAL PAIN: ICD-10-CM

## 2019-12-28 DIAGNOSIS — Z01.818 ENCOUNTER FOR OTHER PREPROCEDURAL EXAMINATION: Chronic | ICD-10-CM

## 2019-12-28 DIAGNOSIS — K59.00 CONSTIPATION, UNSPECIFIED: ICD-10-CM

## 2019-12-28 PROCEDURE — 99283 EMERGENCY DEPT VISIT LOW MDM: CPT

## 2019-12-28 RX ORDER — MINERAL OIL
133 OIL (ML) MISCELLANEOUS ONCE
Refills: 0 | Status: COMPLETED | OUTPATIENT
Start: 2019-12-28 | End: 2019-12-28

## 2019-12-28 RX ADMIN — Medication 133 MILLILITER(S): at 23:49

## 2019-12-28 NOTE — ED ADULT TRIAGE NOTE - CHIEF COMPLAINT QUOTE
Mother states pt is constipated. Last BM Tuesday, given enema. C/o abdominal pain, lactulose given yesterday, no relief. Pt nonverbal. No s/s of n/v

## 2019-12-28 NOTE — ED ADULT NURSE NOTE - NSIMPLEMENTINTERV_GEN_ALL_ED
Implemented All Fall Risk Interventions:  [place] to call system. Call bell, personal items and telephone within reach. Instruct patient to call for assistance. Room bathroom lighting operational. Non-slip footwear when patient is off stretcher. Physically safe environment: no spills, clutter or unnecessary equipment. Stretcher in lowest position, wheels locked, appropriate side rails in place. Provide visual cue, wrist band, yellow gown, etc. Monitor gait and stability. Monitor for mental status changes and reorient to person, place, and time. Review medications for side effects contributing to fall risk. Reinforce activity limits and safety measures with patient and family.

## 2019-12-29 VITALS — HEART RATE: 107 BPM | SYSTOLIC BLOOD PRESSURE: 134 MMHG | DIASTOLIC BLOOD PRESSURE: 77 MMHG

## 2019-12-29 RX ORDER — MULTIVIT WITH MIN/MFOLATE/K2 340-15/3 G
1 POWDER (GRAM) ORAL ONCE
Refills: 0 | Status: COMPLETED | OUTPATIENT
Start: 2019-12-29 | End: 2019-12-29

## 2019-12-29 RX ORDER — ALBUTEROL 90 UG/1
1 AEROSOL, METERED ORAL ONCE
Refills: 0 | Status: DISCONTINUED | OUTPATIENT
Start: 2019-12-29 | End: 2019-12-29

## 2019-12-29 NOTE — ED PROVIDER NOTE - NSFOLLOWUPINSTRUCTIONS_ED_ALL_ED_FT
Follow up with your primary care doctor and GI doctor in 1-2 days     Constipation in Children    WHAT YOU NEED TO KNOW:    Constipation is when your child has hard, dry bowel movements or goes longer than usual in between bowel movements.     DISCHARGE INSTRUCTIONS:    Return to the emergency department if:     You see blood in your child's diaper or bowel movement.      Your child's abdomen is swollen.      Your child does not want to eat or drink.      Your child has severe abdomen or rectal pain.      Your child is vomiting.    Contact your child's healthcare provider if:     Management tips do not help your child have regular bowel movements.      It has been longer than usual between your child's bowel movements.      Your child has bowel movements that are hard or painful to pass.      Your child has an upset stomach.      You have any questions or concerns about your child's condition or care.    Medicines:     Medicine such as a laxative may help relax and loosen your child's intestines to help him or her have a bowel movement. Your child's healthcare provider can tell you the best laxative for your child. Use a laxative made specifically for your child's age and symptoms. Adult laxatives may be too strong for your child. Your provider may recommend your child only use laxatives for a short time. Long-term use may make his or her bowels dependent on the medicine.      Give your child's medicine as directed. Contact your child's healthcare provider if you think the medicine is not working as expected. Tell him or her if your child is allergic to any medicine. Keep a current list of the medicines, vitamins, and herbs your child takes. Include the amounts, and when, how, and why they are taken. Bring the list or the medicines in their containers to follow-up visits. Carry your child's medicine list with you in case of an emergency.    Relieve your child's constipation: Medicines can help your child have a bowel movement more easily. Medicines may increase moisture in your child's bowel movement or increase the motion of his or her intestines.     A suppository may be used to help soften your child's bowel movements. This may make them easier to pass. A suppository is guided into your child's rectum through his or her anus.Suppository for Constipation           An enema is liquid medicine used to clear bowel movement from your child's rectum. The medicine is put into your child's rectum through his or her anus.Enemas         Help manage your child's constipation:     Increase the amount of liquids your child drinks. Liquids can help keep your child's bowel movements soft. Ask how much liquid your child needs to drink and what liquids are best for him or her. Limit sports drinks, soda, and other drinks that contain caffeine.       Feed your child a variety of high-fiber foods. This may help decrease constipation by adding bulk and softness to your child's bowel movements. Healthy foods include fruit, vegetables, whole-grain breads, low-fat dairy products, beans, lean meat, and fish. Ask your child's healthcare provider for more information about a high-fiber diet. Depending on your child's age, his or her provider may also recommend a fiber supplement.           Help your child be active. Regular physical activity can help stimulate your child's intestines. Talk to your child's healthcare provider about the best exercise plan for your child.       Set up a regular time each day for your child to have a bowel movement. This may help train your child's body to have regular bowel movements. Help him or her to sit on the toilet for at least 10 minutes at the same time each day. Do this even if he or she does not have a bowel movement. Do not pressure your young child to have a bowel movement.       Give your child a warm bath. A warm bath at least 1 time each day can help relax his or her rectum. This can make it easier for him or her to have a bowel movement.     Follow up with your child's healthcare provider as directed: Write down your questions so you remember to ask them during your child's visits.       © Copyright [employer] 2019 All illustrations and images included in CareNotes are the copyrighted property of [employer] or [employer]

## 2019-12-29 NOTE — ED PROVIDER NOTE - OBJECTIVE STATEMENT
Patient is a 18 yo m who is autistic presents for evaluation of constipation last bm several days prior he is not vomiting. he denies any fevers or chills.  He has no history of prior surgery at this time.  He has been taking a new medication.  parents denies any other symptoms at this time Patient is a 20 yo m who is autistic presents for evaluation of constipation last bm several days prior he is not vomiting. he denies any fevers or chills.  He has no history of prior surgery at this time.  He has been taking a new medication.  parents denies any other symptoms at this time.

## 2019-12-29 NOTE — ED PROVIDER NOTE - PATIENT PORTAL LINK FT
You can access the FollowMyHealth Patient Portal offered by [facility] by registering at the following website: [email or web address]. By joining [employer]’s FollowMyHealth portal, you will also be able to view your health information using other applications (apps) compatible with our system.

## 2019-12-29 NOTE — ED PROVIDER NOTE - CLINICAL SUMMARY MEDICAL DECISION MAKING FREE TEXT BOX
Patient presents for evaluation of constipation we administered enema he is afebrile and improved I will discharge with follow up to his pmd as well as GI we discussed indications to return with patient

## 2019-12-30 ENCOUNTER — OUTPATIENT (OUTPATIENT)
Dept: OUTPATIENT SERVICES | Facility: HOSPITAL | Age: 19
LOS: 1 days | Discharge: HOME | End: 2019-12-30

## 2019-12-30 ENCOUNTER — LABORATORY RESULT (OUTPATIENT)
Age: 19
End: 2019-12-30

## 2019-12-30 DIAGNOSIS — D64.9 ANEMIA, UNSPECIFIED: ICD-10-CM

## 2019-12-30 DIAGNOSIS — Z01.818 ENCOUNTER FOR OTHER PREPROCEDURAL EXAMINATION: Chronic | ICD-10-CM

## 2020-01-14 ENCOUNTER — APPOINTMENT (OUTPATIENT)
Dept: PEDIATRIC ENDOCRINOLOGY | Facility: CLINIC | Age: 20
End: 2020-01-14

## 2020-01-21 ENCOUNTER — LABORATORY RESULT (OUTPATIENT)
Age: 20
End: 2020-01-21

## 2020-03-10 ENCOUNTER — LABORATORY RESULT (OUTPATIENT)
Age: 20
End: 2020-03-10

## 2020-05-05 ENCOUNTER — LABORATORY RESULT (OUTPATIENT)
Age: 20
End: 2020-05-05

## 2020-05-27 ENCOUNTER — APPOINTMENT (OUTPATIENT)
Dept: PEDIATRIC ENDOCRINOLOGY | Facility: CLINIC | Age: 20
End: 2020-05-27
Payer: COMMERCIAL

## 2020-05-27 PROCEDURE — 99215 OFFICE O/P EST HI 40 MIN: CPT | Mod: 95

## 2020-05-27 RX ORDER — OMEPRAZOLE 40 MG/1
40 CAPSULE, DELAYED RELEASE ORAL
Qty: 60 | Refills: 0 | Status: DISCONTINUED | COMMUNITY
Start: 2017-08-16 | End: 2020-05-27

## 2020-05-27 RX ORDER — OMEPRAZOLE 20 MG/1
20 CAPSULE, DELAYED RELEASE ORAL
Qty: 60 | Refills: 0 | Status: DISCONTINUED | COMMUNITY
Start: 2018-08-29 | End: 2020-05-27

## 2020-05-27 RX ORDER — SULFAMETHOXAZOLE AND TRIMETHOPRIM 400; 80 MG/1; MG/1
400-80 TABLET ORAL
Qty: 60 | Refills: 0 | Status: DISCONTINUED | COMMUNITY
Start: 2019-03-13 | End: 2020-05-27

## 2020-05-27 NOTE — CONSULT LETTER
[Dear  ___] : Dear  [unfilled], [Courtesy Letter:] : I had the pleasure of seeing your patient, [unfilled], in my office today. [Please see my note below.] : Please see my note below. [Consult Closing:] : Thank you very much for allowing me to participate in the care of this patient.  If you have any questions, please do not hesitate to contact me. [Sincerely,] : Sincerely, [FreeTextEntry3] : Lexie Jackson MD\par Pediatric Endocrinology\par [facility]\par [facility]\par

## 2020-05-27 NOTE — HISTORY OF PRESENT ILLNESS
[Home] : at home, [unfilled] , at the time of the visit. [Other Location: e.g. Home (Enter Location, City,State)___] : at [unfilled] [Mother] : mother [FreeTextEntry2] : Cory is 20y10m M here for follow-up of primary hypothyroidism.  On current dose since 10/2017.  He is taking his T4 daily, 5 am,  from all other medications.  Constipation has been bad, s/p ER visit for this requiring soap james enema, giving miralax and PRN suppository.  Good energy.  Sleeping well, good appetite.  Off schedule due to COVID, won't stay in front of zoom classes.  Drive around with him, goes out in back yard and in the swimming in their pool.  Gets frustrated a lot, doing some self injury behaviors a little more than usual, but not outwardly aggressive.\par \par Pubertal delay.  He is noted to have progressed.  \par \par Hx foot fracture L few years ago, parents report mention of osteopenia on xray.  Were unsuccessful doing spine xray.  Vitamin D deficiency being treated, taking daily supplements with calcium and vitamin D.  No other fractures.  No back pain.  \par \par Continued recurrent hematemesis, felt due to gastritis, acid reflux, esophagitis, delayed gastric emptying.  Being followed by GI Dr. Alba. Recurrent Fe deficiency anemia. s/p Fe infusion.  Getting iron supplement. (Also has thallasemia trait).  On last colonoscopy 11/2019 also found polyps felt to be due to Omeprazole so mom stopped.  At this point being referred to adult GI. [FreeTextEntry3] : Winifred Shaikh, mother

## 2020-05-27 NOTE — DISCUSSION/SUMMARY
[FreeTextEntry1] : Cory has primary hypothyroidism on thyroid hormone replacement therapy.  Last visit he required a dose increase.  At this time he is euthyroid, to continue current dose.\par \par There is history of xray evidence of osteopenia and vitamin D insufficiency due to restricted diet.  He is on vitamin D supplement as well as multivitamin.  Vitamin D level was not done at this time.  He is to continue supplementation. They are aware of the importance of supplementation due to his very limited diet. He is further at risk for osteoporosis as a result of chronic depakote therapy.  Additionally delayed puberty can result in decreased bone density, however this is no longer an issue.  We may consider DXA scan if any fracture were to occur.\par \par Cory will soon be turning 19yo.  We discussed process for transition of care.

## 2020-05-27 NOTE — PHYSICAL EXAM
[Dysmorphic] : non-dysmorphic [Murmur] : no murmurs [de-identified] : did not come to camera to be examined

## 2020-05-29 LAB
T4 FREE SERPL-MCNC: 1.2 NG/DL
TSH SERPL-ACNC: 1.69 UIU/ML

## 2020-07-21 ENCOUNTER — LABORATORY RESULT (OUTPATIENT)
Age: 20
End: 2020-07-21

## 2020-08-25 ENCOUNTER — LABORATORY RESULT (OUTPATIENT)
Age: 20
End: 2020-08-25

## 2020-08-25 ENCOUNTER — APPOINTMENT (OUTPATIENT)
Dept: PEDIATRIC HEMATOLOGY/ONCOLOGY | Facility: CLINIC | Age: 20
End: 2020-08-25
Payer: COMMERCIAL

## 2020-08-25 VITALS
DIASTOLIC BLOOD PRESSURE: 70 MMHG | WEIGHT: 170 LBS | RESPIRATION RATE: 18 BRPM | SYSTOLIC BLOOD PRESSURE: 117 MMHG | HEART RATE: 110 BPM | TEMPERATURE: 98.7 F

## 2020-08-25 PROCEDURE — 99214 OFFICE O/P EST MOD 30 MIN: CPT

## 2020-08-25 RX ORDER — IRON SUCROSE 20 MG/ML
200 INJECTION, SOLUTION INTRAVENOUS ONCE
Refills: 0 | Status: COMPLETED | OUTPATIENT
Start: 2020-08-25 | End: 2020-08-25

## 2020-08-25 RX ADMIN — Medication 0.5 MILLIGRAM(S): at 10:40

## 2020-08-25 RX ADMIN — IRON SUCROSE 110 MILLIGRAM(S): 20 INJECTION, SOLUTION INTRAVENOUS at 10:45

## 2020-08-25 RX ADMIN — IRON SUCROSE 200 MILLIGRAM(S): 20 INJECTION, SOLUTION INTRAVENOUS at 11:45

## 2020-08-25 NOTE — PHYSICAL EXAM
[Pallor] : no pallor [Icterus] : not icterus [de-identified] : nonverbal [Normal] : affect appropriate [de-identified] : autistic;

## 2020-08-25 NOTE — HISTORY OF PRESENT ILLNESS
[de-identified] : 19 yo with autism, iron def anemia, esophagitis/gastritis, beta thal trait, hypothyroidism, here for f/u and venofer.  \par  [de-identified] : Last month ferritin was low.  Hgb stable; vomiting 1-2x/week, but no longer dark emesis.  Trying to control diet.  Limited response from po iron int he past. \par Scheduled appt today for venofer to replensih iron stores.

## 2020-08-26 LAB
ERYTHROCYTE [SEDIMENTATION RATE] IN BLOOD BY WESTERGREN METHOD: 1 MM/HR
FERRITIN SERPL-MCNC: 14 NG/ML
HCT VFR BLD CALC: 34.2 %
HGB BLD-MCNC: 10.4 G/DL
IRON SATN MFR SERPL: 34 %
IRON SERPL-MCNC: 123 UG/DL
MCHC RBC-ENTMCNC: 20.5 PG
MCHC RBC-ENTMCNC: 30.4 G/DL
MCV RBC AUTO: 67.5 FL
PLATELET # BLD AUTO: 253 K/UL
PMV BLD: 9.9 FL
RBC # BLD: 5.07 M/UL
RBC # FLD: 16.2 %
RETICS # AUTO: 1.3 %
RETICS AGGREG/RBC NFR: 63.9 K/UL
TIBC SERPL-MCNC: 357 UG/DL
UIBC SERPL-MCNC: 234 UG/DL
WBC # FLD AUTO: 5.59 K/UL

## 2020-09-01 ENCOUNTER — APPOINTMENT (OUTPATIENT)
Dept: PEDIATRIC HEMATOLOGY/ONCOLOGY | Facility: CLINIC | Age: 20
End: 2020-09-01
Payer: COMMERCIAL

## 2020-09-01 ENCOUNTER — LABORATORY RESULT (OUTPATIENT)
Age: 20
End: 2020-09-01

## 2020-09-01 VITALS
HEART RATE: 101 BPM | TEMPERATURE: 97 F | SYSTOLIC BLOOD PRESSURE: 107 MMHG | DIASTOLIC BLOOD PRESSURE: 72 MMHG | RESPIRATION RATE: 20 BRPM

## 2020-09-01 LAB
HCT VFR BLD CALC: 36.6 %
HGB BLD-MCNC: 11 G/DL
MCHC RBC-ENTMCNC: 20.6 PG
MCHC RBC-ENTMCNC: 30.1 G/DL
MCV RBC AUTO: 68.7 FL
PLATELET # BLD AUTO: 278 K/UL
PMV BLD: 10.5 FL
RBC # BLD: 5.33 M/UL
RBC # FLD: 16.7 %
RETICS # AUTO: 1.1 %
RETICS AGGREG/RBC NFR: 57.6 K/UL
WBC # FLD AUTO: 4.91 K/UL

## 2020-09-01 PROCEDURE — 99213 OFFICE O/P EST LOW 20 MIN: CPT

## 2020-09-01 RX ORDER — IRON SUCROSE 20 MG/ML
300 INJECTION, SOLUTION INTRAVENOUS ONCE
Refills: 0 | Status: COMPLETED | OUTPATIENT
Start: 2020-09-01 | End: 2020-09-01

## 2020-09-01 RX ADMIN — IRON SUCROSE 300 MILLIGRAM(S): 20 INJECTION, SOLUTION INTRAVENOUS at 11:30

## 2020-09-01 RX ADMIN — Medication 0.5 MILLIGRAM(S): at 09:40

## 2020-09-01 RX ADMIN — IRON SUCROSE 210 MILLIGRAM(S): 20 INJECTION, SOLUTION INTRAVENOUS at 09:55

## 2020-09-01 NOTE — HISTORY OF PRESENT ILLNESS
[No Feeding Issues] : no feeding issues at this time [de-identified] : 21 yo with autism, iron def anemia, esophagitis/gastritis, beta thal trait, hypothyroidism, here for f/u and venofer.  [de-identified] : Cory is here for iron def anemia and dose #2 of venofer.  His mother reports that he has been well since his last visit.  He is on omeprazole for gastritis and she feels it its helping.  They are also trying to keep him on a bland diet.

## 2020-09-01 NOTE — PHYSICAL EXAM
[Cervical Lymph Nodes Enlarged Posterior Bilaterally] : posterior cervical [Supraclavicular Lymph Nodes Enlarged Bilaterally] : supraclavicular [Cervical Lymph Nodes Enlarged Anterior Bilaterally] : anterior cervical [Normal] : normal appearance, no rash, nodules, vesicles, ulcers, erythema [de-identified] : Dev delay noted, non-verbal-hx of autism

## 2020-09-01 NOTE — END OF VISIT
[FreeTextEntry3] : 19 yo with autism, unclear GI issues, and iron def here for f/u and venofer.  Ferritin had trended down, treated with iv iron to prevent anemia down from his baseline.  Hgb improved after 1 dose of venofer.  Will give dose #2 today.  continue f/u with GI and meds as prescribed.

## 2020-10-19 ENCOUNTER — LABORATORY RESULT (OUTPATIENT)
Age: 20
End: 2020-10-19

## 2020-10-19 ENCOUNTER — APPOINTMENT (OUTPATIENT)
Dept: PEDIATRIC HEMATOLOGY/ONCOLOGY | Facility: CLINIC | Age: 20
End: 2020-10-19
Payer: COMMERCIAL

## 2020-10-19 ENCOUNTER — OUTPATIENT (OUTPATIENT)
Dept: OUTPATIENT SERVICES | Facility: HOSPITAL | Age: 20
LOS: 1 days | Discharge: HOME | End: 2020-10-19

## 2020-10-19 ENCOUNTER — APPOINTMENT (OUTPATIENT)
Dept: PEDIATRIC HEMATOLOGY/ONCOLOGY | Facility: CLINIC | Age: 20
End: 2020-10-19

## 2020-10-19 VITALS — WEIGHT: 155 LBS

## 2020-10-19 VITALS
DIASTOLIC BLOOD PRESSURE: 79 MMHG | SYSTOLIC BLOOD PRESSURE: 120 MMHG | RESPIRATION RATE: 18 BRPM | HEART RATE: 99 BPM | TEMPERATURE: 97.6 F

## 2020-10-19 DIAGNOSIS — Z01.818 ENCOUNTER FOR OTHER PREPROCEDURAL EXAMINATION: Chronic | ICD-10-CM

## 2020-10-19 DIAGNOSIS — F84.0 AUTISTIC DISORDER: ICD-10-CM

## 2020-10-19 DIAGNOSIS — K29.70 GASTRITIS, UNSPECIFIED, WITHOUT BLEEDING: ICD-10-CM

## 2020-10-19 DIAGNOSIS — D56.3 THALASSEMIA MINOR: ICD-10-CM

## 2020-10-19 DIAGNOSIS — E03.9 HYPOTHYROIDISM, UNSPECIFIED: ICD-10-CM

## 2020-10-19 DIAGNOSIS — D50.9 IRON DEFICIENCY ANEMIA, UNSPECIFIED: ICD-10-CM

## 2020-10-19 LAB
HCT VFR BLD CALC: 37.4 %
HGB BLD-MCNC: 11.3 G/DL
IRON SATN MFR SERPL: 26 %
IRON SERPL-MCNC: 91 UG/DL
MCHC RBC-ENTMCNC: 20.5 PG
MCHC RBC-ENTMCNC: 30.2 G/DL
MCV RBC AUTO: 67.8 FL
PLATELET # BLD AUTO: 268 K/UL
PMV BLD: 10.4 FL
RBC # BLD: 5.52 M/UL
RBC # FLD: 15 %
RETICS # AUTO: 0.9 %
RETICS AGGREG/RBC NFR: 47.5 K/UL
TIBC SERPL-MCNC: 353 UG/DL
UIBC SERPL-MCNC: 262 UG/DL
WBC # FLD AUTO: 4.84 K/UL

## 2020-10-19 PROCEDURE — 99213 OFFICE O/P EST LOW 20 MIN: CPT

## 2020-10-19 RX ORDER — IRON SUCROSE 20 MG/ML
300 INJECTION, SOLUTION INTRAVENOUS ONCE
Refills: 0 | Status: COMPLETED | OUTPATIENT
Start: 2020-10-19 | End: 2020-10-19

## 2020-10-19 RX ADMIN — IRON SUCROSE 210 MILLIGRAM(S): 20 INJECTION, SOLUTION INTRAVENOUS at 09:25

## 2020-10-19 RX ADMIN — Medication 0.5 MILLIGRAM(S): at 09:13

## 2020-10-19 RX ADMIN — IRON SUCROSE 300 MILLIGRAM(S): 20 INJECTION, SOLUTION INTRAVENOUS at 11:10

## 2020-10-19 NOTE — REVIEW OF SYSTEMS
[Pallor] : pallor [Anemia] : anemia [Emesis] : emesis [Fever] : no fever [Chills] : no chills [Rash] : no rash [Ecchymoses] : no ecchymoses [Petechiae] : no petechiae [Icterus] : no icterus [Jaundice] : no jaundice [Nasal Discharge] : no nasal discharge [Bruising] : no bruising [Adenopathy] : no adenopathy [Bleeding] : no bleeding [Frequent Infections] : no frequent infections [Cough] : no cough [Dyspnea] : no dyspnea [Wheezing] : no wheezing [Murmur] : no murmur [Syncope] : no syncopy [Abdominal Pain] : no abdominal pain [Constipation] : no constipation [Diarrhea] : no diarrhea [Hematuria] : no hematuria [Hematochezia] : no hematochezia [Joint Pain] : no joint pain [Joint Swelling] : no joint swelling [Seizure] : no seizure [Irritable] : not irritable

## 2020-10-19 NOTE — END OF VISIT
[FreeTextEntry3] : pt seen and examined. tolerating IV venofer.  Check iron studies today- no coffee ground emesis in a long time per mother.  Momt o call for resutls in 2 days to determine if further venofer is needed. and f/u plan

## 2020-10-19 NOTE — PHYSICAL EXAM
[Pallor] : pallor [Normal] : normal appearance, no rash, nodules, vesicles, ulcers, erythema [Icterus] : not icterus [de-identified] : developmental delay, non-verbal hx of autism

## 2020-10-19 NOTE — HISTORY OF PRESENT ILLNESS
[de-identified] : 21 y/o male with autism, iron deficiency anemia, esophagitis/gastritis, beta thal trait and hypothyroidism seen in clinic today for scheduled  Venofer infusion.  Mother reports that since Cory has returned to school in the beginning of September he vomits at least once per day, clear colored emesis.  Vomiting episodes occur at night.   Reports changes in appetite.  Eating a smaller amount during the day in school, snacking more at home.  Denies blood in emesis, urine or stool. No bruising noted.   Denies recent fever or URI symptoms.   Taking Miralax daily, reports normal bowel movement.  \par \par Mother reports that Cory was last seen by GI in June.  Has continued Omeprazole daily, Carafate (4xs per day), also compliant with daily iron supplement, MVI and Vit D supplement.\par

## 2020-10-30 LAB — FERRITIN SERPL-MCNC: 67 NG/ML

## 2021-01-13 LAB
25(OH)D3 SERPL-MCNC: 27 NG/ML
ALBUMIN SERPL ELPH-MCNC: 4.3 G/DL
ALP BLD-CCNC: 61 U/L
ALT SERPL-CCNC: 8 U/L
ANION GAP SERPL CALC-SCNC: 8 MMOL/L
AST SERPL-CCNC: 11 U/L
BILIRUB SERPL-MCNC: <0.2 MG/DL
BUN SERPL-MCNC: 7 MG/DL
CALCIUM SERPL-MCNC: 9.9 MG/DL
CHLORIDE SERPL-SCNC: 102 MMOL/L
CO2 SERPL-SCNC: 30 MMOL/L
CREAT SERPL-MCNC: 0.7 MG/DL
GLUCOSE SERPL-MCNC: 92 MG/DL
POTASSIUM SERPL-SCNC: 4.2 MMOL/L
PROT SERPL-MCNC: 6.1 G/DL
SODIUM SERPL-SCNC: 140 MMOL/L
T4 FREE SERPL-MCNC: 1.2 NG/DL
TSH SERPL-ACNC: 1.32 UIU/ML

## 2021-02-09 ENCOUNTER — APPOINTMENT (OUTPATIENT)
Dept: PEDIATRIC ENDOCRINOLOGY | Facility: CLINIC | Age: 21
End: 2021-02-09
Payer: COMMERCIAL

## 2021-02-09 ENCOUNTER — APPOINTMENT (OUTPATIENT)
Dept: PEDIATRIC ENDOCRINOLOGY | Facility: CLINIC | Age: 21
End: 2021-02-09

## 2021-02-09 PROCEDURE — 99214 OFFICE O/P EST MOD 30 MIN: CPT | Mod: 95

## 2021-02-09 RX ORDER — SUCRALFATE 1 G/10ML
1 SUSPENSION ORAL
Qty: 1200 | Refills: 0 | Status: ACTIVE | COMMUNITY
Start: 2020-09-16

## 2021-02-09 RX ORDER — HYDROCORTISONE VALERATE 2 MG/G
0.2 CREAM TOPICAL
Qty: 60 | Refills: 0 | Status: DISCONTINUED | COMMUNITY
Start: 2020-08-11

## 2021-02-09 RX ORDER — OMEPRAZOLE 40 MG/1
CAPSULE, DELAYED RELEASE ORAL
Refills: 0 | Status: ACTIVE | COMMUNITY

## 2021-02-09 RX ORDER — DIAZEPAM 10 MG/1
10 TABLET ORAL
Qty: 1 | Refills: 0 | Status: DISCONTINUED | COMMUNITY
Start: 2021-01-29

## 2021-02-09 RX ORDER — BUSPIRONE HYDROCHLORIDE 5 MG/1
5 TABLET ORAL
Qty: 60 | Refills: 0 | Status: ACTIVE | COMMUNITY
Start: 2020-12-01

## 2021-02-09 RX ORDER — DIAZEPAM 5 MG/1
5 TABLET ORAL
Qty: 1 | Refills: 0 | Status: DISCONTINUED | COMMUNITY
Start: 2020-10-30

## 2021-02-09 RX ORDER — SPIRONOLACTONE 25 MG
600-400 TABLET ORAL
Qty: 100 | Refills: 6 | Status: ACTIVE | COMMUNITY
Start: 2021-02-09 | End: 1900-01-01

## 2021-02-09 RX ORDER — CLONAZEPAM 2 MG/1
2 TABLET, ORALLY DISINTEGRATING ORAL
Qty: 30 | Refills: 0 | Status: ACTIVE | COMMUNITY
Start: 2021-01-28

## 2021-02-09 RX ORDER — CHROMIUM 200 MCG
1000 TABLET ORAL
Refills: 0 | Status: ACTIVE | COMMUNITY

## 2021-02-09 NOTE — CONSULT LETTER
[Dear  ___] : Dear  [unfilled], [Please see my note below.] : Please see my note below. [Courtesy Letter:] : I had the pleasure of seeing your patient, [unfilled], in my office today. [Consult Closing:] : Thank you very much for allowing me to participate in the care of this patient.  If you have any questions, please do not hesitate to contact me. [Sincerely,] : Sincerely, [FreeTextEntry3] : Lexie Jackson MD\par Pediatric Endocrinology\par [facility]\par [facility]\par

## 2021-02-09 NOTE — DISCUSSION/SUMMARY
[FreeTextEntry1] : Cory has primary hypothyroidism on thyroid hormone replacement therapy.   At this time he is euthyroid, to continue current dose.\par \par There is history of xray evidence of osteopenia and vitamin D insufficiency due to restricted diet.  He is on vitamin D supplement as well as multivitamin and calcium (though inconsistently).  Vitamin D level is only minimally below normal range. He is to continue supplementation. They are aware of the importance of supplementation due to his very limited diet. He is further at risk for osteoporosis as a result of chronic depakote therapy.   We may consider DXA scan if any fracture were to occur (will be very difficult to have him undergo DXA).\par \par Cory will soon be turning 21yo.  We winston discussed transition of care.  Will see him once more before turns 21y.  Mother has names of adult endocrine to look into.

## 2021-02-09 NOTE — HISTORY OF PRESENT ILLNESS
[Home] : at home, [unfilled] , at the time of the visit. [Mother] : mother [FreeTextEntry2] : Cory is 20y6m M here for follow-up of primary hypothyroidism.  Last visit dose maintained.  He is taking his T4 daily, 5 am,  from all other medications.  Constipation has been controlled with miralax.  Good energy.  Sleeping well, good appetite.  Back in day program, 1:1 all day.  Behavior has been much better.  Good energy.\par \par Hx foot fracture L few years ago, parents report mention of osteopenia on xray.  No further fractures.  Were unsuccessful doing spine xray.  Vitamin D deficiency being treated, taking daily supplements with calcium and vitamin D. Forgetting to give calcium. No back pain, no limb pain, no limping.\par \par Continues to vomit but less frequent and only if eats late, but not hematemesis, felt due to gastritis, acid reflux, esophagitis, delayed gastric emptying.  Being followed by GI Dr. Alba. Recurrent Fe deficiency anemia. s/p Fe infusion.  Getting iron supplement. (Also has thallasemia trait).  On last colonoscopy 11/2019 also found polyps felt to be due to Omeprazole so mom stopped.   [Medical Office: ([facility])___] : at the medical office located in  [TWNoteComboBox1] : acquired hypothyroidism [FreeTextEntry3] : Winifred Shaikh, mother

## 2021-02-19 ENCOUNTER — APPOINTMENT (OUTPATIENT)
Dept: PEDIATRIC HEMATOLOGY/ONCOLOGY | Facility: CLINIC | Age: 21
End: 2021-02-19

## 2021-02-23 ENCOUNTER — LABORATORY RESULT (OUTPATIENT)
Age: 21
End: 2021-02-23

## 2021-02-25 ENCOUNTER — NON-APPOINTMENT (OUTPATIENT)
Age: 21
End: 2021-02-25

## 2021-04-26 ENCOUNTER — RX RENEWAL (OUTPATIENT)
Age: 21
End: 2021-04-26

## 2021-05-04 ENCOUNTER — LABORATORY RESULT (OUTPATIENT)
Age: 21
End: 2021-05-04

## 2021-08-10 ENCOUNTER — LABORATORY RESULT (OUTPATIENT)
Age: 21
End: 2021-08-10

## 2021-08-18 LAB
T4 FREE SERPL-MCNC: 1.2 NG/DL
TSH SERPL-ACNC: 1.13 UIU/ML

## 2021-08-26 ENCOUNTER — APPOINTMENT (OUTPATIENT)
Dept: PEDIATRIC ENDOCRINOLOGY | Facility: CLINIC | Age: 21
End: 2021-08-26
Payer: COMMERCIAL

## 2021-08-26 VITALS — HEIGHT: 68.9 IN | BODY MASS INDEX: 22.32 KG/M2 | WEIGHT: 150.7 LBS

## 2021-08-26 PROCEDURE — 99213 OFFICE O/P EST LOW 20 MIN: CPT

## 2021-08-26 NOTE — PHYSICAL EXAM
[Healthy Appearing] : healthy appearing [Normal Appearance] : normal appearance [Normal S1 and S2] : normal S1 and S2 [Clear to Ausculation Bilaterally] : clear to auscultation bilaterally [Abdomen Soft] : soft [Abdomen Tenderness] : non-tender [Well Nourished] : well nourished [Dysmorphic] : non-dysmorphic [Goiter] : no goiter [Murmur] : no murmurs [Normal] : normal  [de-identified] : normal oropharynx

## 2021-08-26 NOTE — CONSULT LETTER
[Dear  ___] : Dear  [unfilled], [Courtesy Letter:] : I had the pleasure of seeing your patient, [unfilled], in my office today. [Please see my note below.] : Please see my note below. [Consult Closing:] : Thank you very much for allowing me to participate in the care of this patient.  If you have any questions, please do not hesitate to contact me. [Sincerely,] : Sincerely, [FreeTextEntry3] : Lexie Jackson MD\par Pediatric Endocrinology\par [facility]\par [facility]\par

## 2021-08-26 NOTE — DISCUSSION/SUMMARY
[FreeTextEntry1] : Cory has primary hypothyroidism on thyroid hormone replacement therapy.   At this time he is euthyroid, to continue current dose.\par \par There is history of xray evidence of osteopenia and vitamin D insufficiency due to restricted diet.  He is further at risk for osteoporosis as a result of chronic depakote therapy. He is on vitamin D supplement as well as multivitamin and calcium 2-3 times a day.  Vitamin D level is only minimally below normal range. He is to continue supplementation. They are aware of the importance of supplementation due to his very limited diet. \par \par Cory is 22 yo and discussed transition of care. Names of providers were given whom they can see in the future.

## 2021-08-26 NOTE — HISTORY OF PRESENT ILLNESS
[FreeTextEntry2] : Cory is 22 yo M here for follow-up of primary hypothyroidism. Last visit dose maintained.  He is taking his T4 daily, 6:30 am,  from all other medications.  Constipation has been controlled with miralax having a bowel movement every 2-3 days. Enemas as needed.  Good energy.  Sleeping well, good appetite. Mother denies he has dry skin, hair falling, fatigue or cold intolerance. Back in day program, 1:1 all day.  Behavior has been much better. \par \par Osteopenia on xray.  Found when hx foot fracture L few years ago.  No further fractures.  Were unsuccessful doing spine xray.  Vitamin D deficiency being treated, taking daily supplements with calcium and vitamin D. Mother gives calcium 2-3 times a week due to concern for constipation. No back pain, no limb pain, no limping.\par \par Continues to vomit but less frequent and only if eats late and with spicy food, but not hematemesis, felt due to gastritis, acid reflux, esophagitis, delayed gastric emptying.  Being followed by GI Dr. Alba. Recurrent Fe deficiency anemia. Due for an iron infusion 1 week.  Getting iron supplement. (Also has thallasemia trait).  Currently taking Pepcid, Sucralfate and Omeprazole.  Plans to see GI doctor in 2 weeks.\par \par  [TWNoteComboBox1] : acquired hypothyroidism

## 2021-09-02 ENCOUNTER — LABORATORY RESULT (OUTPATIENT)
Age: 21
End: 2021-09-02

## 2021-09-02 ENCOUNTER — APPOINTMENT (OUTPATIENT)
Dept: PEDIATRIC HEMATOLOGY/ONCOLOGY | Facility: CLINIC | Age: 21
End: 2021-09-02
Payer: COMMERCIAL

## 2021-09-02 VITALS
SYSTOLIC BLOOD PRESSURE: 110 MMHG | DIASTOLIC BLOOD PRESSURE: 68 MMHG | TEMPERATURE: 97.5 F | WEIGHT: 150 LBS | RESPIRATION RATE: 18 BRPM | HEART RATE: 100 BPM

## 2021-09-02 DIAGNOSIS — Z00.00 ENCOUNTER FOR GENERAL ADULT MEDICAL EXAMINATION W/OUT ABNORMAL FINDINGS: ICD-10-CM

## 2021-09-02 PROCEDURE — 99214 OFFICE O/P EST MOD 30 MIN: CPT

## 2021-09-02 RX ORDER — IRON SUCROSE 20 MG/ML
300 INJECTION, SOLUTION INTRAVENOUS ONCE
Refills: 0 | Status: COMPLETED | OUTPATIENT
Start: 2021-09-02 | End: 2021-09-02

## 2021-09-02 RX ADMIN — Medication 0.5 MILLIGRAM(S): at 09:55

## 2021-09-02 RX ADMIN — IRON SUCROSE 210 MILLIGRAM(S): 20 INJECTION, SOLUTION INTRAVENOUS at 10:00

## 2021-09-02 RX ADMIN — IRON SUCROSE 300 MILLIGRAM(S): 20 INJECTION, SOLUTION INTRAVENOUS at 11:30

## 2021-09-02 NOTE — PHYSICAL EXAM
[Thin] : thin [Cervical Lymph Nodes Enlarged Posterior Bilaterally] : posterior cervical [Supraclavicular Lymph Nodes Enlarged Bilaterally] : supraclavicular [Cervical Lymph Nodes Enlarged Anterior Bilaterally] : anterior cervical [Normal] : normal appearance, no rash, nodules, vesicles, ulcers, erythema [de-identified] : Developmental delay noted-non-verbal-hx of autism

## 2021-09-02 NOTE — REVIEW OF SYSTEMS
[Emesis] : emesis [Negative] : Endocrine [Normal Appetite] : abnormal appetite [de-identified] : Autism-non-verbal

## 2021-09-02 NOTE — END OF VISIT
[FreeTextEntry3] : pt seen and examined. Hx obtained from mother (indep historian).  22 yo with autism, beta thal trait, unclear GI problems (vomiting, and h/o coffee ground emesis multiple times in the past) here for f/u.  Ferritin trending down and low, despite daily po iron. no recent dark emesis, but has had episodes of emesis due to eating  more junk food. Will give series of venofer infusions to replenish iron stores. venofer dose #1 today.  f/u 1-2 weeks for next dose or sooner with any concenr.

## 2021-09-02 NOTE — REASON FOR VISIT
[Follow-Up Visit] : a follow-up visit for [Anemia] : anemia [Mother] : mother [FreeTextEntry2] : Autism

## 2021-09-02 NOTE — HISTORY OF PRESENT ILLNESS
[de-identified] : Cory is being seen in follow up for iron def anemia.  He has a history of autism and beta thalassemia trait.  His mother reports that he has been home from school this past week and so is eating more.  He vomits each night but there is no blood in it.  He will see his GI doctor in 2 weeks.  He has not had any fevers, cough or other respiratory symptoms.  He is vaccinated for COVID 19.

## 2021-09-03 LAB
ERYTHROCYTE [SEDIMENTATION RATE] IN BLOOD BY WESTERGREN METHOD: 4 MM/HR
FERRITIN SERPL-MCNC: 15 NG/ML
HCT VFR BLD CALC: 36.3 %
HGB BLD-MCNC: 10.8 G/DL
IRON SATN MFR SERPL: 12 %
IRON SERPL-MCNC: 44 UG/DL
MCHC RBC-ENTMCNC: 20.2 PG
MCHC RBC-ENTMCNC: 29.8 G/DL
MCV RBC AUTO: 68 FL
PLATELET # BLD AUTO: 247 K/UL
PMV BLD: 10.7 FL
RBC # BLD: 5.34 M/UL
RBC # FLD: 15.2 %
RETICS # AUTO: 0.9 %
RETICS AGGREG/RBC NFR: 48.6 K/UL
TIBC SERPL-MCNC: 365 UG/DL
UIBC SERPL-MCNC: 321 UG/DL
WBC # FLD AUTO: 4.39 K/UL

## 2021-09-16 ENCOUNTER — APPOINTMENT (OUTPATIENT)
Dept: PEDIATRIC HEMATOLOGY/ONCOLOGY | Facility: CLINIC | Age: 21
End: 2021-09-16
Payer: COMMERCIAL

## 2021-09-16 ENCOUNTER — LABORATORY RESULT (OUTPATIENT)
Age: 21
End: 2021-09-16

## 2021-09-16 VITALS
HEART RATE: 104 BPM | SYSTOLIC BLOOD PRESSURE: 116 MMHG | DIASTOLIC BLOOD PRESSURE: 77 MMHG | RESPIRATION RATE: 20 BRPM | TEMPERATURE: 98.8 F

## 2021-09-16 PROCEDURE — 99214 OFFICE O/P EST MOD 30 MIN: CPT

## 2021-09-16 RX ORDER — IRON SUCROSE 20 MG/ML
300 INJECTION, SOLUTION INTRAVENOUS ONCE
Refills: 0 | Status: COMPLETED | OUTPATIENT
Start: 2021-09-16 | End: 2021-09-16

## 2021-09-16 RX ADMIN — Medication 0.5 MILLIGRAM(S): at 10:00

## 2021-09-16 RX ADMIN — IRON SUCROSE 210 MILLIGRAM(S): 20 INJECTION, SOLUTION INTRAVENOUS at 10:05

## 2021-09-16 RX ADMIN — IRON SUCROSE 300 MILLIGRAM(S): 20 INJECTION, SOLUTION INTRAVENOUS at 11:35

## 2021-09-17 NOTE — REASON FOR VISIT
[Follow-Up Visit] : a follow-up visit for [Mother] : mother [FreeTextEntry2] : iron deficiency anemia

## 2021-09-17 NOTE — HISTORY OF PRESENT ILLNESS
[No Feeding Issues] : no feeding issues at this time [de-identified] : 22 y/o male with autism, beta thal trait and iron deficiency anemia seen in clinic today for dose #2 Venofer.  Mother states that Cory has been well since last visit.  States that he is still having episodes of vomiting, clear in color, mostly at night.   States that vomiting does not occur every day.  Denies recent fever or URI symptoms.  No diarrhea.  Mother states that he has been compliant with Ferrous Sulfate daily and multivitamin.  Scheduled to see GI next week

## 2021-09-17 NOTE — END OF VISIT
[FreeTextEntry3] : pt seen and examined. 22 yo male with autism, beta thal trait, iron def, and vomiting with h/o hematemesis- no recent bloody emesis.  Following with GI.  Here for dose 2 venofer.  F/u 1-2 weeks or sooner with any concerns.

## 2021-09-17 NOTE — PHYSICAL EXAM
[Cervical Lymph Nodes Enlarged Posterior Bilaterally] : posterior cervical [Supraclavicular Lymph Nodes Enlarged Bilaterally] : supraclavicular [Cervical Lymph Nodes Enlarged Anterior Bilaterally] : anterior cervical [Normal] : affect appropriate [de-identified] : h/o autism, non verbal

## 2021-09-23 LAB
HCT VFR BLD CALC: 36.3 %
HGB BLD-MCNC: 10.6 G/DL
MCHC RBC-ENTMCNC: 20 PG
MCHC RBC-ENTMCNC: 29.2 G/DL
MCV RBC AUTO: 68.5 FL
PLATELET # BLD AUTO: 241 K/UL
PMV BLD: 11.3 FL
RBC # BLD: 5.3 M/UL
RBC # FLD: 16 %
RETICS # AUTO: 1.3 %
RETICS AGGREG/RBC NFR: 68.4 K/UL
WBC # FLD AUTO: 3.1 K/UL

## 2021-09-30 ENCOUNTER — APPOINTMENT (OUTPATIENT)
Dept: PEDIATRIC HEMATOLOGY/ONCOLOGY | Facility: CLINIC | Age: 21
End: 2021-09-30
Payer: COMMERCIAL

## 2021-09-30 ENCOUNTER — LABORATORY RESULT (OUTPATIENT)
Age: 21
End: 2021-09-30

## 2021-09-30 LAB
HCT VFR BLD CALC: 38.2 %
HGB BLD-MCNC: 11.8 G/DL
MCHC RBC-ENTMCNC: 20.9 PG
MCHC RBC-ENTMCNC: 30.9 G/DL
MCV RBC AUTO: 67.6 FL
PLATELET # BLD AUTO: 213 K/UL
PMV BLD: 11.2 FL
RBC # BLD: 5.65 M/UL
RBC # FLD: 16 %
RETICS # AUTO: 1.1 %
RETICS AGGREG/RBC NFR: 61 K/UL
WBC # FLD AUTO: 3.42 K/UL

## 2021-09-30 PROCEDURE — 99214 OFFICE O/P EST MOD 30 MIN: CPT

## 2021-09-30 RX ORDER — IRON SUCROSE 20 MG/ML
300 INJECTION, SOLUTION INTRAVENOUS ONCE
Refills: 0 | Status: COMPLETED | OUTPATIENT
Start: 2021-09-30 | End: 2021-09-30

## 2021-09-30 RX ADMIN — IRON SUCROSE 200 MILLIGRAM(S): 20 INJECTION, SOLUTION INTRAVENOUS at 09:30

## 2021-09-30 RX ADMIN — Medication 0.5 MILLIGRAM(S): at 09:30

## 2021-09-30 NOTE — HISTORY OF PRESENT ILLNESS
[No Feeding Issues] : no feeding issues at this time [de-identified] : Cory is here in follow up for iron def anemia.  He has a history of autism as well.  He is well today as per his mother.  No fevers, cough or other respiratory symptoms.  Appetite and energy levels are normal.  His mother offers no concerns at this time.

## 2021-09-30 NOTE — PHYSICAL EXAM
[Cervical Lymph Nodes Enlarged Posterior Bilaterally] : posterior cervical [Supraclavicular Lymph Nodes Enlarged Bilaterally] : supraclavicular [Cervical Lymph Nodes Enlarged Anterior Bilaterally] : anterior cervical [Normal] : normal appearance, no rash, nodules, vesicles, ulcers, erythema [de-identified] : Developmental delay noted- hx of autism

## 2021-09-30 NOTE — END OF VISIT
[FreeTextEntry3] : 22 yo male with iron def anemia and autism here for dose #3 venofer.  f/u 1 week for dose #4 or sooner with any concerns. hgb trending up

## 2021-10-22 ENCOUNTER — APPOINTMENT (OUTPATIENT)
Dept: PEDIATRIC HEMATOLOGY/ONCOLOGY | Facility: CLINIC | Age: 21
End: 2021-10-22
Payer: COMMERCIAL

## 2021-10-22 ENCOUNTER — LABORATORY RESULT (OUTPATIENT)
Age: 21
End: 2021-10-22

## 2021-10-22 ENCOUNTER — OUTPATIENT (OUTPATIENT)
Dept: OUTPATIENT SERVICES | Facility: HOSPITAL | Age: 21
LOS: 1 days | Discharge: HOME | End: 2021-10-22

## 2021-10-22 VITALS
TEMPERATURE: 99 F | RESPIRATION RATE: 18 BRPM | SYSTOLIC BLOOD PRESSURE: 135 MMHG | HEART RATE: 102 BPM | WEIGHT: 153 LBS | DIASTOLIC BLOOD PRESSURE: 84 MMHG

## 2021-10-22 DIAGNOSIS — F84.0 AUTISTIC DISORDER: ICD-10-CM

## 2021-10-22 DIAGNOSIS — Z76.89 PERSONS ENCOUNTERING HEALTH SERVICES IN OTHER SPECIFIED CIRCUMSTANCES: ICD-10-CM

## 2021-10-22 DIAGNOSIS — Z01.818 ENCOUNTER FOR OTHER PREPROCEDURAL EXAMINATION: Chronic | ICD-10-CM

## 2021-10-22 DIAGNOSIS — E61.1 IRON DEFICIENCY: ICD-10-CM

## 2021-10-22 DIAGNOSIS — D56.3 THALASSEMIA MINOR: ICD-10-CM

## 2021-10-22 LAB
HCT VFR BLD CALC: 35.2 %
HGB BLD-MCNC: 10.9 G/DL
IRON SATN MFR SERPL: 41 %
IRON SERPL-MCNC: 130 UG/DL
MCHC RBC-ENTMCNC: 21 PG
MCHC RBC-ENTMCNC: 31 G/DL
MCV RBC AUTO: 68 FL
PLATELET # BLD AUTO: 198 K/UL
PMV BLD: 10.4 FL
RBC # BLD: 5.18 M/UL
RBC # FLD: 15.7 %
RETICS # AUTO: 0.8 %
RETICS AGGREG/RBC NFR: 40.9 K/UL
TIBC SERPL-MCNC: 315 UG/DL
UIBC SERPL-MCNC: 185 UG/DL
WBC # FLD AUTO: 2.44 K/UL

## 2021-10-22 PROCEDURE — 99214 OFFICE O/P EST MOD 30 MIN: CPT

## 2021-10-22 RX ORDER — IRON SUCROSE 20 MG/ML
300 INJECTION, SOLUTION INTRAVENOUS ONCE
Refills: 0 | Status: COMPLETED | OUTPATIENT
Start: 2021-10-22 | End: 2021-10-22

## 2021-10-22 RX ADMIN — IRON SUCROSE 300 MILLIGRAM(S): 20 INJECTION, SOLUTION INTRAVENOUS at 11:33

## 2021-10-22 RX ADMIN — IRON SUCROSE 200 MILLIGRAM(S): 20 INJECTION, SOLUTION INTRAVENOUS at 10:00

## 2021-10-25 NOTE — ED PROVIDER NOTE - ATTENDING CONTRIBUTION TO CARE
I was present for and supervised the key and critical aspects of the procedures performed during the care of the patient. Patient presents for evaluation of contipation he is unable to provide any reliable history secondary to autism  On physical exam the patient is nc/at perrla eomi oropharynx clear cta b/l, rrr s1s2 noted abd-soft with no guarding   we administered an enema with successful bm I will discharge at this time home

## 2021-10-26 LAB — FERRITIN SERPL-MCNC: 113 NG/ML

## 2021-10-26 NOTE — HISTORY OF PRESENT ILLNESS
[de-identified] : 20 y/o male with autism, beta thal trait and iron deficiency anemia seen in clinic today for scheduled visit for Venofer infusion dose 4/4.  Mother states that Cory's eating scheduled has changed.   She notices that he tends to have more episodes of vomiting when he eats past 7pm at night.   States that he had vomiting x4 over the past week, with one episode of darker colored emesis.    Mother denies recent fever, cough or congestion.  No bruising or bleeding.  Seen by GI this week and stool for occult blood recommended otherwise no change in treatment plan.   No acute concerns

## 2021-10-26 NOTE — PHYSICAL EXAM
[Cervical Lymph Nodes Enlarged Posterior Bilaterally] : posterior cervical [Supraclavicular Lymph Nodes Enlarged Bilaterally] : supraclavicular [Cervical Lymph Nodes Enlarged Anterior Bilaterally] : anterior cervical [Normal] : affect appropriate [de-identified] : h/o autsims, nonverbal

## 2021-10-26 NOTE — REVIEW OF SYSTEMS
[Anemia] : anemia [Emesis] : emesis [Fever] : no fever [Chills] : no chills [Fatigue] : no fatigue [Weakness] : no weakness [Rash] : no rash [Petechiae] : no petechiae [Ecchymoses] : no ecchymoses [Jaundice] : no jaundice [Icterus] : no icterus [Nasal Discharge] : no nasal discharge [Mouth Ulcers] : no mouth ulcers [Pallor] : no pallor [Bleeding] : no bleeding [Bruising] : no bruising [Adenopathy] : no adenopathy [Frequent Infections] : no frequent infections [Dyspnea] : no dyspnea [Cough] : no cough [Wheezing] : no wheezing [Stridor] : no stridor [Chest Pain] : no chest paint [Abdominal Pain] : no abdominal pain [Constipation] : no constipation [Diarrhea] : no diarrhea [Dysuria] : no dysuria [Hematuria] : no hematuria [Myalgia] : no myalgia [Hernández] : not hernández [Irritable] : not irritable

## 2021-10-26 NOTE — END OF VISIT
[FreeTextEntry3] : pt seen. agree with above. check cbc/retic and iron studies within the month to determine if additional IV iron is needed at this time

## 2021-10-27 DIAGNOSIS — D50.9 IRON DEFICIENCY ANEMIA, UNSPECIFIED: ICD-10-CM

## 2021-11-08 ENCOUNTER — EMERGENCY (EMERGENCY)
Facility: HOSPITAL | Age: 21
LOS: 0 days | Discharge: HOME | End: 2021-11-08
Attending: EMERGENCY MEDICINE | Admitting: EMERGENCY MEDICINE
Payer: COMMERCIAL

## 2021-11-08 VITALS
WEIGHT: 154.98 LBS | HEART RATE: 110 BPM | OXYGEN SATURATION: 98 % | TEMPERATURE: 98 F | HEIGHT: 66 IN | RESPIRATION RATE: 18 BRPM

## 2021-11-08 DIAGNOSIS — K59.00 CONSTIPATION, UNSPECIFIED: ICD-10-CM

## 2021-11-08 DIAGNOSIS — F03.90 UNSPECIFIED DEMENTIA WITHOUT BEHAVIORAL DISTURBANCE: ICD-10-CM

## 2021-11-08 DIAGNOSIS — K21.9 GASTRO-ESOPHAGEAL REFLUX DISEASE WITHOUT ESOPHAGITIS: ICD-10-CM

## 2021-11-08 DIAGNOSIS — Z01.818 ENCOUNTER FOR OTHER PREPROCEDURAL EXAMINATION: Chronic | ICD-10-CM

## 2021-11-08 DIAGNOSIS — F84.0 AUTISTIC DISORDER: ICD-10-CM

## 2021-11-08 DIAGNOSIS — E03.9 HYPOTHYROIDISM, UNSPECIFIED: ICD-10-CM

## 2021-11-08 PROCEDURE — 99284 EMERGENCY DEPT VISIT MOD MDM: CPT

## 2021-11-08 RX ORDER — MULTIVIT WITH MIN/MFOLATE/K2 340-15/3 G
1 POWDER (GRAM) ORAL ONCE
Refills: 0 | Status: DISCONTINUED | OUTPATIENT
Start: 2021-11-08 | End: 2021-11-08

## 2021-11-08 RX ADMIN — Medication 1 MILLIGRAM(S): at 21:49

## 2021-11-08 NOTE — ED PROVIDER NOTE - CLINICAL SUMMARY MEDICAL DECISION MAKING FREE TEXT BOX
21yM hx of  autism pw constipation  no BM x3 days  + flatus, no vomiting no fever     soap suds unsuccessful  (ativan given priro for anxiolysis)-  rectal  soft stool,  - family wants to  take patient home -  dcd  with Mag citrate - they will followup with their GI  Patient to be discharged from ED well apperaing.  Verbal instructions given, including instructions to return to ED immediately for any new, worsening, or concerning symptoms. Limitations of ED work up discussed.  Parent reports understanding of above with capacity and insight. Written discharge instructions additionally given, including follow-up plan.

## 2021-11-08 NOTE — ED PROVIDER NOTE - NSFOLLOWUPINSTRUCTIONS_ED_ALL_ED_FT
Follow up with your GI doctor and primary care doctor in 1-2 days    Constipation    WHAT YOU NEED TO KNOW:    Constipation is when you have hard, dry bowel movements, or you go longer than usual between bowel movements.     DISCHARGE INSTRUCTIONS:    Call your doctor if:     You have blood in your bowel movements.      You have a fever and abdominal pain with the constipation.      Your constipation gets worse.       You start to vomit.      You have questions or concerns about your condition or care.    Medicines:     Medicine such as a laxative may help relax and loosen your intestines to help you have a bowel movement. Your provider may recommend you only use laxatives for a short time. Long-term use may make your bowels dependent on the medicine.      Take your medicine as directed. Contact your healthcare provider if you think your medicine is not helping or if you have side effects. Tell him of her if you are allergic to any medicine. Keep a list of the medicines, vitamins, and herbs you take. Include the amounts, and when and why you take them. Bring the list or the pill bottles to follow-up visits. Carry your medicine list with you in case of an emergency.    Relieve constipation:     A suppository may be used to help soften your bowel movements. This may make them easier to pass. A suppository is guided into your rectum through your anus.Suppository for Constipation           An enema is liquid medicine used to clear bowel movement from your rectum. The medicine is put into your rectum through your anus.Enemas         Prevent constipation:     Drink liquids as directed. You may need to drink extra liquids to help soften and move your bowels. Ask how much liquid to drink each day and which liquids are best for you.       Eat high-fiber foods. This may help decrease constipation by adding bulk to your bowel movements. High-fiber foods include fruit, vegetables, whole-grain breads and cereals, and beans. Your healthcare provider or dietitian can help you create a high-fiber meal plan. Your provider may also recommend a fiber supplement if you cannot get enough fiber from food.            Exercise regularly. Regular physical activity can help stimulate your intestines. Walking is a good exercise to prevent or relieve constipation. Ask which exercises are best for you.Walking for Exercise           Schedule a time each day to have a bowel movement. This may help train your body to have regular bowel movements. Bend forward while you are on the toilet to help move the bowel movement out. Sit on the toilet for at least 10 minutes, even if you do not have a bowel movement.       Talk to your healthcare provider about your medicines. Certain medicines, such as opioids, can cause constipation. Your provider may be able to make medicine changes. For example, he or she may change the kind of medicine, or change when you take it.    Follow up with your healthcare provider as directed: Write down your questions so you remember to ask them during your visits.        © Copyright [employer] 2019 All illustrations and images included in CareNotes are the copyrighted property of [employer]M., Inc[employer] or [employer].

## 2021-11-08 NOTE — ED PROVIDER NOTE - PROGRESS NOTE DETAILS
Pt with small BM after soap suds enema. Parents requesting to take patient home and will contact his GI doctor tomorrow. family given bottle of mag citrate.

## 2021-11-08 NOTE — ED ADULT NURSE NOTE - NSICDXPASTMEDICALHX_GEN_ALL_CORE_FT
PAST MEDICAL HISTORY:  Autism spectrum    Development delay     GERD (gastroesophageal reflux disease)     Hypothyroidism     Seizures last 2014

## 2021-11-08 NOTE — ED PROVIDER NOTE - PHYSICAL EXAMINATION
I am unable to obtain a comprehensive history, review of systems, past medical history, and/or physical exam due to constraints imposed by the urgency of the patient's clinical condition and/or mental status.     Physical Exam    Vital Signs: I have reviewed the initial vital signs.  Constitutional: well-nourished, appears stated age, no acute distress  Cardiovascular: S1 and S2, regular rate, regular rhythm, well-perfused extremities, radial pulses equal and 2+  Respiratory: unlabored respiratory effort, clear to auscultation bilaterally no wheezing, rales and rhonchi  Gastrointestinal: soft, non-tender abdomen, no pulsatile mass, normal bowl sounds  Musculoskeletal: supple neck, no lower extremity edema, no midline tenderness  Integumentary: warm, dry, no rash  Neurologic: awake, alert moving all extremities

## 2021-11-08 NOTE — ED PROVIDER NOTE - PATIENT PORTAL LINK FT
You can access the FollowMyHealth Patient Portal offered by [facility] by registering at the following website: [email or web address]. By joining [employer]’s FollowMyHealth portal, you will also be able to view your health information using other applications (apps) compatible with our system.

## 2021-11-08 NOTE — ED PROVIDER NOTE - OBJECTIVE STATEMENT
21 year male past medical history of autism and constipation brought in by family for no BM in the last week other than small ones. mother states she has been giving him the OTC laxities and stool softeners and he is only going small amounts. Mother states that last time this happened he got a soaps suds enema and felt better and was able to go. no fever, no vomiting. patient is acting like his normal self.

## 2022-02-22 ENCOUNTER — LABORATORY RESULT (OUTPATIENT)
Age: 22
End: 2022-02-22

## 2022-03-15 ENCOUNTER — RX RENEWAL (OUTPATIENT)
Age: 22
End: 2022-03-15

## 2022-05-26 ENCOUNTER — APPOINTMENT (OUTPATIENT)
Dept: ENDOCRINOLOGY | Facility: CLINIC | Age: 22
End: 2022-05-26
Payer: COMMERCIAL

## 2022-05-26 VITALS
TEMPERATURE: 98 F | WEIGHT: 135 LBS | DIASTOLIC BLOOD PRESSURE: 70 MMHG | HEIGHT: 69 IN | SYSTOLIC BLOOD PRESSURE: 118 MMHG | BODY MASS INDEX: 19.99 KG/M2 | OXYGEN SATURATION: 98 % | HEART RATE: 100 BPM

## 2022-05-26 PROCEDURE — 99213 OFFICE O/P EST LOW 20 MIN: CPT

## 2022-05-26 PROCEDURE — 99203 OFFICE O/P NEW LOW 30 MIN: CPT

## 2022-05-26 NOTE — ASSESSMENT
[FreeTextEntry1] : Cory is 22 yo M with known autism , who present to [facility] for primary hypothyroidism and Vitamin D deficiency , previous patient of peds brandon Davila \par \par #primary hypothyroidism \par -   He is taking his T4 hyacinth 75 mcg , 6:30 am,  from all other medications.\par -  Mother denies he has dry skin, , fatigue or cold intolerance. has some hair loss and chronic constipation related to dietary habits requiring Miralax . behavior is ok , more calm then in the past \par - Tft's normal will keep same dose of Lt4 75 mcg daily \par \par # Osteopenia on xray.  Found when hx foot fracture L few years ago.  No further fractures.  Were unsuccessful doing spine xray in the past.   Vitamin D deficiency being treated, taking daily supplements with calcium and vitamin D.. No back pain, no limb pain, no limping.\par - encourage compliance and discussed fall precautions\par - check vit D level next visit \par

## 2022-05-26 NOTE — REVIEW OF SYSTEMS
[Nl] : Neurological [Vomiting] : vomiting [Constipation] : constipation [As Noted in HPI] : as noted in HPI

## 2022-05-26 NOTE — REASON FOR VISIT
[Initial Evaluation] : an initial evaluation [Hypothyroidism] : hypothyroidism [Other___] : [unfilled] [Parent] : parent [Follow-Up: _____] : a [unfilled] follow-up visit  [Mother] : mother [FreeTextEntry2] : Dr Aida robledo

## 2022-05-26 NOTE — PHYSICAL EXAM
[Healthy Appearing] : healthy appearing [Well Nourished] : well nourished [Dysmorphic] : non-dysmorphic [Normal Appearance] : normal appearance [Goiter] : no goiter [Normal S1 and S2] : normal S1 and S2 [Murmur] : no murmurs [Clear to Ausculation Bilaterally] : clear to auscultation bilaterally [Abdomen Soft] : soft [Abdomen Tenderness] : non-tender [Normal] : normal  [de-identified] : normal oropharynx [Alert] : alert [No Acute Distress] : no acute distress [No Proptosis] : no proptosis [No Lid Lag] : no lid lag [Thyroid Not Enlarged] : the thyroid was not enlarged [No Respiratory Distress] : no respiratory distress [No Accessory Muscle Use] : no accessory muscle use [Regular Rhythm] : with a regular rhythm [No Edema] : no peripheral edema [Soft] : abdomen soft [No Stigmata of Cushings Syndrome] : no stigmata of Cushings Syndrome [de-identified] : limited exam

## 2022-05-26 NOTE — HISTORY OF PRESENT ILLNESS
[FreeTextEntry2] : Cory is 22 yo M here for follow-up of primary hypothyroidism. Last visit dose maintained.  He is taking his T4 daily, 6:30 am,  from all other medications.  Constipation has been controlled with miralax having a bowel movement every 2-3 days. Enemas as needed.  Good energy.  Sleeping well, good appetite. Mother denies he has dry skin, hair falling, fatigue or cold intolerance. Back in day program, 1:1 all day.  Behavior has been much better. \par \par Osteopenia on xray.  Found when hx foot fracture L few years ago.  No further fractures.  Were unsuccessful doing spine xray.  Vitamin D deficiency being treated, taking daily supplements with calcium and vitamin D. Mother gives calcium 2-3 times a week due to concern for constipation. No back pain, no limb pain, no limping.\par \par Continues to vomit but less frequent and only if eats late and with spicy food, but not hematemesis, felt due to gastritis, acid reflux, esophagitis, delayed gastric emptying.  Being followed by GI Dr. Alba. Recurrent Fe deficiency anemia. Due for an iron infusion 1 week.  Getting iron supplement. (Also has thallasemia trait).  Currently taking Pepcid, Sucralfate and Omeprazole.  Plans to see GI doctor in 2 weeks.\par \par  [TWNoteComboBox1] : acquired hypothyroidism [FreeTextEntry1] : Cory is 22 yo M with known autism , who present to [facility] for primary hypothyroidism and Vitamin D deficiency , previous patient of peds endo Dr Jose Davila \par \par #primary hypothyroidism \par -   He is taking his T4 hyacinth 75 mcg , 6:30 am,  from all other medications.\par -  Mother denies he has dry skin, , fatigue or cold intolerance. has some hair loss and chronic constipation related to dietary habits requiring Miralax . behavior is ok , more calm then in the past \par \par # Osteopenia on xray.  Found when hx foot fracture L few years ago.  No further fractures.  Were unsuccessful doing spine xray in the past.   Vitamin D deficiency being treated, taking daily supplements with calcium and vitamin D.. No back pain, no limb pain, no limping.\par \par Has  gastritis, acid reflux, esophagitis, delayed gastric emptying.  Being followed by GI Dr. Alba. Recurrent Fe deficiency anemia.  has thallasemia trait), and recent thrombocytopenia attributed to ? depakoate \par \par

## 2022-05-27 LAB
T4 FREE SERPL-MCNC: 1 NG/DL
TSH SERPL-ACNC: 0.94 UIU/ML

## 2022-06-03 NOTE — PHYSICAL EXAM
06/03/22                            Alison Berry  [id number]  [place] [address]    To Whom It May Concern:    This is to certify Ailson Berry was evaluated with Hossein Liu DC on 06/03/22 and can return to modified work.     RESTRICTIONS: A break in standing position every hour as needed.  To prevent further aggravation of symptoms, she may require reduced hours per working day until resolution.     Valid from 6/3/22 to 6/10/22        Hossein Liu DC  [facility]  [address] [place] [address]  Phone: [phone]  Fax: [phone]     [Normal] : affect appropriate [de-identified] : nonverbal

## 2022-07-26 ENCOUNTER — LABORATORY RESULT (OUTPATIENT)
Age: 22
End: 2022-07-26

## 2022-08-31 RX ORDER — UREA 10 %
LOTION (ML) TOPICAL
Qty: 30 | Refills: 0 | Status: DISCONTINUED | COMMUNITY
Start: 2016-09-09 | End: 2022-08-31

## 2022-08-31 RX ORDER — QUETIAPINE FUMARATE 100 MG/1
100 TABLET ORAL
Qty: 60 | Refills: 0 | Status: DISCONTINUED | COMMUNITY
Start: 2017-07-10 | End: 2022-08-31

## 2022-09-06 ENCOUNTER — APPOINTMENT (OUTPATIENT)
Dept: GASTROENTEROLOGY | Facility: CLINIC | Age: 22
End: 2022-09-06

## 2022-09-06 DIAGNOSIS — Z78.9 OTHER SPECIFIED HEALTH STATUS: ICD-10-CM

## 2022-09-06 DIAGNOSIS — Z86.39 PERSONAL HISTORY OF OTHER ENDOCRINE, NUTRITIONAL AND METABOLIC DISEASE: ICD-10-CM

## 2022-09-06 DIAGNOSIS — R56.9 UNSPECIFIED CONVULSIONS: ICD-10-CM

## 2022-09-06 PROCEDURE — 99204 OFFICE O/P NEW MOD 45 MIN: CPT | Mod: 95

## 2022-09-06 RX ORDER — FERROUS SULFATE 325(65) MG
325 TABLET ORAL
Refills: 0 | Status: ACTIVE | COMMUNITY

## 2022-09-06 RX ORDER — FAMOTIDINE 40 MG/1
40 TABLET, FILM COATED ORAL
Qty: 1 | Refills: 6 | Status: ACTIVE | COMMUNITY
Start: 2022-09-06 | End: 1900-01-01

## 2022-09-06 RX ORDER — PANTOPRAZOLE 40 MG/1
40 TABLET, DELAYED RELEASE ORAL DAILY
Qty: 30 | Refills: 6 | Status: ACTIVE | COMMUNITY
Start: 2022-09-06 | End: 1900-01-01

## 2022-09-06 NOTE — HISTORY OF PRESENT ILLNESS
[Home] : at home, [unfilled] , at the time of the visit. [Medical Office: ([facility])___] : at the medical office located in  [FreeTextEntry3] : Mother [FreeTextEntry4] : Bhumi Hallman  [de-identified] : 22 year old male patient autistic, seizure disorder, hypothyroidism, with chronic GERD partially controlled on omeprazole and famotidine. Hx taken from the mom. last EGD in 2018 with mild  esophagitis. \par Reports no blood in the stools, no abdominal pain or weight loss. Mom reports he has constipation and she is giving him miralax with good relief.

## 2022-09-06 NOTE — PHYSICAL EXAM
[General Appearance - Alert] : alert [Hearing Threshold Finger Rub Not Trigg] : hearing was normal [] : no respiratory distress [Skin Color & Pigmentation] : normal skin color and pigmentation [Oriented To Time, Place, And Person] : oriented to person, place, and time

## 2022-09-06 NOTE — ASSESSMENT
[FreeTextEntry1] : 22 year old male patient autistic, seizure disorder, hypothyroidism, with chronic GERD partially controlled on omeprazole and famotidine. Hx taken from the mom. last EGD in 2018 with mild  esophagitis. \par Reports no blood in the stools, no abdominal pain or weight loss. Mom reports he has constipation and she is giving him miralax with good relief. \par Currently he is having vomiting, non bloody, non bilious every night with ? regurgitation at night.\par \par \par GERD\par hx of esophagitis\par Rec: pantoprazole 40 in am and famotidine 40 at bedtime\par carafate TID\par if not better in 1 month, will get an EGD\par Mom will call back to schedule.\par Risks and benefits discussed with patient.\par

## 2022-09-29 RX ORDER — DIVALPROEX SODIUM 125 MG/1
125 TABLET, DELAYED RELEASE ORAL
Qty: 240 | Refills: 6 | Status: ACTIVE | COMMUNITY
Start: 2022-09-29 | End: 1900-01-01

## 2022-10-25 ENCOUNTER — LABORATORY RESULT (OUTPATIENT)
Age: 22
End: 2022-10-25

## 2022-12-30 LAB
25(OH)D3 SERPL-MCNC: 17 NG/ML
ALBUMIN SERPL ELPH-MCNC: 4.4 G/DL
ALP BLD-CCNC: 81 U/L
ALT SERPL-CCNC: 9 U/L
ANION GAP SERPL CALC-SCNC: 12 MMOL/L
AST SERPL-CCNC: 18 U/L
BILIRUB SERPL-MCNC: 0.3 MG/DL
BUN SERPL-MCNC: 13 MG/DL
CALCIUM SERPL-MCNC: 9.8 MG/DL
CHLORIDE SERPL-SCNC: 103 MMOL/L
CO2 SERPL-SCNC: 27 MMOL/L
CREAT SERPL-MCNC: 0.8 MG/DL
EGFR: 128 ML/MIN/1.73M2
GLUCOSE SERPL-MCNC: 113 MG/DL
POTASSIUM SERPL-SCNC: 4.4 MMOL/L
PROT SERPL-MCNC: 6.2 G/DL
SODIUM SERPL-SCNC: 142 MMOL/L
T4 FREE SERPL-MCNC: 1.1 NG/DL
TSH SERPL-ACNC: 2.76 UIU/ML

## 2023-01-05 ENCOUNTER — APPOINTMENT (OUTPATIENT)
Dept: ENDOCRINOLOGY | Facility: CLINIC | Age: 23
End: 2023-01-05
Payer: COMMERCIAL

## 2023-01-05 VITALS
HEIGHT: 69 IN | HEART RATE: 90 BPM | DIASTOLIC BLOOD PRESSURE: 80 MMHG | WEIGHT: 135 LBS | BODY MASS INDEX: 19.99 KG/M2 | SYSTOLIC BLOOD PRESSURE: 128 MMHG | TEMPERATURE: 98 F | OXYGEN SATURATION: 98 %

## 2023-01-05 PROCEDURE — 99212 OFFICE O/P EST SF 10 MIN: CPT

## 2023-01-05 NOTE — REASON FOR VISIT
[Initial Evaluation] : an initial evaluation [Hypothyroidism] : hypothyroidism [Other___] : [unfilled] [Parent] : parent [FreeTextEntry2] : Dr Aida robledo

## 2023-01-05 NOTE — HISTORY OF PRESENT ILLNESS
[FreeTextEntry1] : Cory is 20 yo M with known autism , who present for follow up  for primary hypothyroidism and Vitamin D deficiency\par \par #primary hypothyroidism \par -   He is taking his T4 daily 75 mcg , 6:30 am,  from all other medications.\par -  Mother denies he has dry skin, , fatigue or cold intolerance. has some hair loss and chronic constipation related to dietary habits requiring Miralax . behavior is ok , more calm then in the past \par \par # Osteopenia on xray.  Found when hx foot fracture L few years ago.  No further fractures.  Were unsuccessful doing spine xray in the past.   Vitamin D deficiency being treated in t past but recently he was refusing it so was taking it less often \par \par Has  gastritis, acid reflux, esophagitis, delayed gastric emptying.  Being followed by GI now dr Barrera  Recurrent Fe deficiency anemia.  has thalassemia trait), and recent thrombocytopenia attributed to ? Depakote \par \par

## 2023-01-05 NOTE — PHYSICAL EXAM
[Alert] : alert [No Acute Distress] : no acute distress [No Proptosis] : no proptosis [No Lid Lag] : no lid lag [Thyroid Not Enlarged] : the thyroid was not enlarged [No Respiratory Distress] : no respiratory distress [No Accessory Muscle Use] : no accessory muscle use [Regular Rhythm] : with a regular rhythm [No Edema] : no peripheral edema [No Stigmata of Cushings Syndrome] : no stigmata of Cushings Syndrome [de-identified] : limited exam

## 2023-01-05 NOTE — REVIEW OF SYSTEMS
[Recent Weight Gain (___ Lbs)] : recent weight gain: [unfilled] lbs [Constipation] : constipation [As Noted in HPI] : as noted in HPI

## 2023-01-05 NOTE — ASSESSMENT
[FreeTextEntry1] : Cory is 22 yo M with known autism , who present for follow up  primary hypothyroidism and Vitamin D deficiency, with mother \par \par #primary hypothyroidism \par -   He is taking his T4 daily 75 mcg , 6:30 am,  from all other medications.\par - clinically euthyroid will continue same, discussed medications timing given he is on PPI \par \par # Osteopenia on xray.  Found when hx foot fracture L few years ago.  No further fractures.  Were unsuccessful doing spine xray in the past.   Vitamin D deficiency as he was not taking it daily not always accepting to take it \par mom will try again to give daily \par \par - encourage compliance and discussed fall precautions\par - next visit can do telehealth \par

## 2023-02-22 NOTE — H&P PST ADULT - EYES
Acute exacerbation of congestive heart failure Acute exacerbation of congestive heart failure EOMI; PERRL; no drainage or redness

## 2023-03-07 ENCOUNTER — LABORATORY RESULT (OUTPATIENT)
Age: 23
End: 2023-03-07

## 2023-03-24 ENCOUNTER — APPOINTMENT (OUTPATIENT)
Dept: PEDIATRIC HEMATOLOGY/ONCOLOGY | Facility: CLINIC | Age: 23
End: 2023-03-24
Payer: COMMERCIAL

## 2023-03-24 ENCOUNTER — LABORATORY RESULT (OUTPATIENT)
Age: 23
End: 2023-03-24

## 2023-03-24 ENCOUNTER — OUTPATIENT (OUTPATIENT)
Dept: OUTPATIENT SERVICES | Facility: HOSPITAL | Age: 23
LOS: 1 days | End: 2023-03-24
Payer: COMMERCIAL

## 2023-03-24 VITALS
TEMPERATURE: 97.9 F | SYSTOLIC BLOOD PRESSURE: 117 MMHG | HEART RATE: 100 BPM | RESPIRATION RATE: 20 BRPM | DIASTOLIC BLOOD PRESSURE: 80 MMHG

## 2023-03-24 VITALS
DIASTOLIC BLOOD PRESSURE: 79 MMHG | SYSTOLIC BLOOD PRESSURE: 118 MMHG | HEART RATE: 104 BPM | TEMPERATURE: 97.3 F | RESPIRATION RATE: 18 BRPM

## 2023-03-24 DIAGNOSIS — E61.1 IRON DEFICIENCY: ICD-10-CM

## 2023-03-24 DIAGNOSIS — D56.3 THALASSEMIA MINOR: ICD-10-CM

## 2023-03-24 DIAGNOSIS — Z01.818 ENCOUNTER FOR OTHER PREPROCEDURAL EXAMINATION: Chronic | ICD-10-CM

## 2023-03-24 PROCEDURE — 96367 TX/PROPH/DG ADDL SEQ IV INF: CPT

## 2023-03-24 PROCEDURE — 85027 COMPLETE CBC AUTOMATED: CPT

## 2023-03-24 PROCEDURE — 96365 THER/PROPH/DIAG IV INF INIT: CPT

## 2023-03-24 PROCEDURE — 82728 ASSAY OF FERRITIN: CPT

## 2023-03-24 PROCEDURE — 85046 RETICYTE/HGB CONCENTRATE: CPT

## 2023-03-24 PROCEDURE — 99214 OFFICE O/P EST MOD 30 MIN: CPT

## 2023-03-24 PROCEDURE — 96375 TX/PRO/DX INJ NEW DRUG ADDON: CPT

## 2023-03-24 PROCEDURE — 36415 COLL VENOUS BLD VENIPUNCTURE: CPT

## 2023-03-24 PROCEDURE — 83550 IRON BINDING TEST: CPT

## 2023-03-24 PROCEDURE — 83540 ASSAY OF IRON: CPT

## 2023-03-24 RX ORDER — FERUMOXYTOL 510 MG/17ML
510 INJECTION INTRAVENOUS ONCE
Refills: 0 | Status: COMPLETED | OUTPATIENT
Start: 2023-03-24 | End: 2023-03-24

## 2023-03-24 RX ORDER — ONDANSETRON 8 MG/1
8 TABLET, FILM COATED ORAL ONCE
Refills: 0 | Status: COMPLETED | OUTPATIENT
Start: 2023-03-24 | End: 2023-03-24

## 2023-03-24 RX ORDER — DIPHENHYDRAMINE HCL 50 MG
25 CAPSULE ORAL ONCE
Refills: 0 | Status: COMPLETED | OUTPATIENT
Start: 2023-03-24 | End: 2023-03-24

## 2023-03-24 RX ADMIN — FERUMOXYTOL 510 MILLIGRAM(S): 510 INJECTION INTRAVENOUS at 11:15

## 2023-03-24 RX ADMIN — ONDANSETRON 8 MILLIGRAM(S): 8 TABLET, FILM COATED ORAL at 10:00

## 2023-03-24 RX ADMIN — Medication 25 MILLIGRAM(S): at 10:30

## 2023-03-24 RX ADMIN — ONDANSETRON 108 MILLIGRAM(S): 8 TABLET, FILM COATED ORAL at 09:30

## 2023-03-24 RX ADMIN — FERUMOXYTOL 156 MILLIGRAM(S): 510 INJECTION INTRAVENOUS at 10:30

## 2023-03-24 RX ADMIN — Medication 0.5 MILLIGRAM(S): at 09:25

## 2023-03-24 RX ADMIN — Medication 101 MILLIGRAM(S): at 10:00

## 2023-03-24 NOTE — PHYSICAL EXAM
[Thin] : thin [Pallor] : pallor [Cervical Lymph Nodes Enlarged Posterior Bilaterally] : posterior cervical [Supraclavicular Lymph Nodes Enlarged Bilaterally] : supraclavicular [Cervical Lymph Nodes Enlarged Anterior Bilaterally] : anterior cervical [Normal] : normal appearance, no rash, nodules, vesicles, ulcers, erythema [de-identified] : Developmental delay noted-non-verbal-hx of autism

## 2023-03-24 NOTE — REVIEW OF SYSTEMS
[Pallor] : pallor [Anemia] : anemia [Emesis] : emesis [Fever] : no fever [Decreased Appetite] : normal appetite [Rash] : no rash [Jaundice] : no jaundice [Acne] : no acne [Eye Pain] : no eye pain [Red Eyes] : no red eyes [Eye Swelling] : no eye swelling [Ear Discharge] : no ear discharge [Otitis Media] : no otitis media [Mouth Ulcers] : no mouth ulcers [Bleeding] : no bleeding [Dyspnea] : no dyspnea [Cough] : no cough [Stridor] : no stridor [Chest Pain] : no chest pain [Edema] : no edema [Syncope] : no syncope [Diarrhea] : no diarrhea [Joint Pain] : no joint pain [Joint Swelling] : no joint swelling

## 2023-03-24 NOTE — HISTORY OF PRESENT ILLNESS
[de-identified] : 21 yo male patient with h/o autism, seizures, hypothyroidism, reflux/vomiting, beta thal trait and iron def here for followup.  \par He is on on both pantoprazole and famotidine.  Last dose of iv iron was oct 2021.  \par He has been having episodes of vomiting.  Following with GI and endocrine.  \par \par Labs this month- ferritin 10 -- trended down over the last year.  hgb 10 g/dL.  Discussed options with mother regarding iron supplementation IV, as he has been taking po iron with inadequate absorption.  Discussed venofer vs injectafer.  Mother opted for Injectafer as it required fewer visits/infusions for Cory- Injectafer was not approved by insurance but Feraheme was and so Feraheme was ordered for supplementation today.\par \par

## 2023-03-25 DIAGNOSIS — D56.3 THALASSEMIA MINOR: ICD-10-CM

## 2023-03-30 ENCOUNTER — LABORATORY RESULT (OUTPATIENT)
Age: 23
End: 2023-03-30

## 2023-03-30 ENCOUNTER — OUTPATIENT (OUTPATIENT)
Dept: OUTPATIENT SERVICES | Facility: HOSPITAL | Age: 23
LOS: 1 days | End: 2023-03-30
Payer: COMMERCIAL

## 2023-03-30 ENCOUNTER — APPOINTMENT (OUTPATIENT)
Dept: PEDIATRIC HEMATOLOGY/ONCOLOGY | Facility: CLINIC | Age: 23
End: 2023-03-30
Payer: COMMERCIAL

## 2023-03-30 VITALS
SYSTOLIC BLOOD PRESSURE: 131 MMHG | RESPIRATION RATE: 22 BRPM | HEART RATE: 101 BPM | DIASTOLIC BLOOD PRESSURE: 64 MMHG | TEMPERATURE: 98.2 F

## 2023-03-30 DIAGNOSIS — K20.90 ESOPHAGITIS, UNSPECIFIED WITHOUT BLEEDING: ICD-10-CM

## 2023-03-30 DIAGNOSIS — D56.3 THALASSEMIA MINOR: ICD-10-CM

## 2023-03-30 DIAGNOSIS — Z01.818 ENCOUNTER FOR OTHER PREPROCEDURAL EXAMINATION: Chronic | ICD-10-CM

## 2023-03-30 DIAGNOSIS — K29.70 GASTRITIS, UNSPECIFIED, W/OUT BLEEDING: ICD-10-CM

## 2023-03-30 PROCEDURE — 96365 THER/PROPH/DIAG IV INF INIT: CPT

## 2023-03-30 PROCEDURE — 96375 TX/PRO/DX INJ NEW DRUG ADDON: CPT

## 2023-03-30 PROCEDURE — 99214 OFFICE O/P EST MOD 30 MIN: CPT

## 2023-03-30 PROCEDURE — 96367 TX/PROPH/DG ADDL SEQ IV INF: CPT

## 2023-03-30 PROCEDURE — 36415 COLL VENOUS BLD VENIPUNCTURE: CPT

## 2023-03-30 PROCEDURE — 85027 COMPLETE CBC AUTOMATED: CPT

## 2023-03-30 PROCEDURE — 82728 ASSAY OF FERRITIN: CPT

## 2023-03-30 PROCEDURE — 85046 RETICYTE/HGB CONCENTRATE: CPT

## 2023-03-30 RX ORDER — FERUMOXYTOL 510 MG/17ML
510 INJECTION INTRAVENOUS ONCE
Refills: 0 | Status: COMPLETED | OUTPATIENT
Start: 2023-03-30 | End: 2023-03-30

## 2023-03-30 RX ORDER — DIPHENHYDRAMINE HCL 50 MG
25 CAPSULE ORAL ONCE
Refills: 0 | Status: COMPLETED | OUTPATIENT
Start: 2023-03-30 | End: 2023-03-30

## 2023-03-30 RX ORDER — ONDANSETRON 8 MG/1
8 TABLET, FILM COATED ORAL ONCE
Refills: 0 | Status: COMPLETED | OUTPATIENT
Start: 2023-03-30 | End: 2023-03-30

## 2023-03-30 RX ADMIN — FERUMOXYTOL 156 MILLIGRAM(S): 510 INJECTION INTRAVENOUS at 10:30

## 2023-03-30 RX ADMIN — Medication 101 MILLIGRAM(S): at 10:00

## 2023-03-30 RX ADMIN — ONDANSETRON 8 MILLIGRAM(S): 8 TABLET, FILM COATED ORAL at 10:30

## 2023-03-30 RX ADMIN — ONDANSETRON 108 MILLIGRAM(S): 8 TABLET, FILM COATED ORAL at 09:30

## 2023-03-30 RX ADMIN — FERUMOXYTOL 510 MILLIGRAM(S): 510 INJECTION INTRAVENOUS at 11:15

## 2023-03-30 RX ADMIN — Medication 0.5 MILLIGRAM(S): at 09:25

## 2023-03-30 RX ADMIN — Medication 25 MILLIGRAM(S): at 10:00

## 2023-03-31 PROBLEM — K29.70 GASTRITIS: Status: ACTIVE | Noted: 2017-02-23

## 2023-03-31 PROBLEM — K20.90 ESOPHAGITIS: Status: ACTIVE | Noted: 2017-08-21

## 2023-03-31 NOTE — REVIEW OF SYSTEMS
[Constipation] : constipation [Fever] : no fever [Decreased Appetite] : normal appetite [Fatigue] : no fatigue [Weakness] : no weakness [Rash] : no rash [Petechiae] : no petechiae [Ecchymoses] : no ecchymoses [Jaundice] : no jaundice [Icterus] : no icterus [Nasal Discharge] : no nasal discharge [Epistaxis] : no epistaxis [Sore Throat] : no sore throat [Mouth Ulcers] : no mouth ulcers [Pallor] : no pallor [Bleeding] : no bleeding [Bruising] : no bruising [Anemia] : no anemia [Dyspnea] : no dyspnea [Cough] : no cough [Stridor] : no stridor [Murmur] : no murmur [Abdominal Pain] : no abdominal pain [Nausea] : no nausea [Emesis] : no emesis [Hematuria] : no hematuria [Seizure] : no seizure [Hernández] : not hernández [Irritable] : not irritable

## 2023-03-31 NOTE — END OF VISIT
[Time Spent: ___ minutes] : I have spent [unfilled] minutes of time on the encounter. [FreeTextEntry3] : pt seen and examined. 21 yo with autism, and iron def here for dose 2 of feraheme.   ferraheme today- dose tolerated with premeds administered.  labs in 3-4 weeks to confirm iron stores replenished.  if iron stores replenished will plan for repeat labs in 4-6 months.

## 2023-03-31 NOTE — HISTORY OF PRESENT ILLNESS
[No Feeding Issues] : no feeding issues at this time [de-identified] : This is a scheduled follow up visit for Feraheme infusion (dose#2)  for this 21 y/o male with autism, seizures, hypothyroidism, reflux/vomiting, beta thal trait and iron deficiency anemia. Mother states that patient tolerated first infusion well.   States patient had vomited once a day for 2-3 days over the past week.  Denies dark colored emesis.  No reports of bruising or bleeding.  Denies fever or recent infection.  Patient with h/o constipation.  Will take Miralax with good relief of symptoms.   Reports good energy level. \par \par GI meds: Omeprazole 40mg in the morning and Pepcid 40 mg in the evening

## 2023-04-04 LAB
FERRITIN SERPL-MCNC: 332 NG/ML
FERRITIN SERPL-MCNC: 8 NG/ML
HCT VFR BLD CALC: 32.2 %
HCT VFR BLD CALC: 33.4 %
HGB BLD-MCNC: 10.1 G/DL
HGB BLD-MCNC: 9.9 G/DL
IRON SATN MFR SERPL: 23 %
IRON SERPL-MCNC: 82 UG/DL
MCHC RBC-ENTMCNC: 20.2 PG
MCHC RBC-ENTMCNC: 20.8 PG
MCHC RBC-ENTMCNC: 30.2 G/DL
MCHC RBC-ENTMCNC: 30.7 G/DL
MCV RBC AUTO: 66.9 FL
MCV RBC AUTO: 67.5 FL
PLATELET # BLD AUTO: 217 K/UL
PLATELET # BLD AUTO: 227 K/UL
PMV BLD: 10.5 FL
PMV BLD: 11 FL
RBC # BLD: 4.77 M/UL
RBC # BLD: 4.99 M/UL
RBC # FLD: 15.9 %
RBC # FLD: 16.4 %
RETICS # AUTO: 0.7 %
RETICS # AUTO: 0.9 %
RETICS AGGREG/RBC NFR: 35.4 K/UL
RETICS AGGREG/RBC NFR: 42 K/UL
TIBC SERPL-MCNC: 361 UG/DL
UIBC SERPL-MCNC: 279 UG/DL
WBC # FLD AUTO: 2.68 K/UL
WBC # FLD AUTO: 2.91 K/UL

## 2023-04-10 ENCOUNTER — APPOINTMENT (OUTPATIENT)
Age: 23
End: 2023-04-10
Payer: COMMERCIAL

## 2023-04-10 PROCEDURE — 99205 OFFICE O/P NEW HI 60 MIN: CPT | Mod: 95

## 2023-04-10 NOTE — HISTORY OF PRESENT ILLNESS
[FreeTextEntry1] : Telemedicine visit:\par Patient Location at time of Video Visit: \par Cory's mom was home during the visit.\par Physician Location at time of Visit: \par [address] 8th Floor Physician Practice office\par Other Participants Present:\par None.\par  \par I obtained parent consent and agreement for this video encounter.\par Additionally, I reviewed with the parent and addressed all questions regarding the disposition plan and follow-up instructions including any pertinent findings. The parent has acknowledged understanding of this information. I informed the parent that a copy of the after-visit summary for this visit is available for her to refer to within the “Follow my chart” [facility] portal.\par  \par  \par CC:  \par 22 y old ex premature non identical twin A right handed young man with autism, hypothyroidism and non lesional focal epilepsy. \par Telemedicine visit to establish care.\par \par HPI:\par Cory has been followed at [facility].\par He is a fraternal twin A, with early onset developmental lags followed by subsequent diagnosis of autism, and non leisonal focal epilepsy.\par A brain MRI was normal ([facility] 1/2011).\par Although suggested, a formal genetic evaluation never completed.\par He has also been seen by Peds Rheumatology and has a diagnosis of Sjogren's and hypothyroidism. Also has long standing history of GI symptoms, followed at [facility].\par \par His epilepsy was medically difficult to control for several years, until he was placed on combination of brand Depakote and brand Lamictal. He is not having side effects to these medications. He has been seizure free since 8/2014. His anticonvulsants are also likely also contributing to regulate his mood and behavior to a certain degree. In addition, prior seizures were subtle and there is a concern about undetected seizures if we lower the anticonvulsants. Blood tests are done regularly to maintain therapeutic levels and to monitor for potential side effects.\par He has long standing history of autism related behavioral symptoms, for which he is receiving generic Quetiapine, without side effects. \par General health is good, but he has hypothyroidism, dyspepsia and KIERAN. He is being followed by Endocrinology (takes Levothyroxine), and a GI specialist. \par Sleep is good, through the night. Does not snore.\par Goes to special education program. 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 Most recent trough level 5.4\par Brand Depakote 250/250/375 mg. Most recent trough level 68\par Quetiapine generic 150/25/150 mg.\par PRN Quetiapine 25 to 50 mg for behavioral episodes\par PRN Clonazepam 2 mg ODT as rescue (not needed for years) \par  \par Prior CNS medications:\par Generic Strattera. Ineffective\par Generic Olanzapine. Ineffective\par Generic Lamotrigine. Side effects.\par Generic Valproic acid. Side effects.\par  \par Review of systems \par General: Stable weight\par Skin: No rashes, lumps, color change, changes in hair/nails  \par Head: No head injury  \par Eyes: No discharges\par Ears: No discharges  \par Nose/Sinuses: No congestion, discharge, epistaxis  \par Mouth/Throat: No sore throat, hoarseness  \par Neck: No lumps, stiffness  \par Respiratory: No cough, hemoptysis, snoring  \par Cardiac: No edema, dyspnea, orthopnea  \par GI: No diarrhea. KIERAN reflux.\par : No hematuria  \par Musculoskeletal: No lesions\par Neuro: No recent seizures.\par Psych: Behavioral symptoms\par  \par Physical Exam: \par Deferred, Cory was not available at the time of the video visit.\par   \par Assessment: \par 22 y old ex premature non identical twin A right handed young man with autism, hypothyroidism and non lesional focal epilepsy. \par Good seizure control and no side effects while on combination of brand Lamictal and brand Depakote.\par Good control of behavioral symptoms while on generic Quetiapine.\par   \par Plan: \par Cory’s televisit today had a duration of 60 minutes (>50% of which was spent in direct counseling and coordination of his care).\par I personally reviewed all pertinent aspects of Cory's medical history, outside medical records, test results, recent developments, and then delineated next steps for his neurological care.\par Cory's mom and I reviewed autism, epilepsy in general, different treatment modalities, co morbidities and overall prognosis. Epilepsy is a chronic illness with potential for injury that poses a threat to life or bodily function.\par We discussed the differences in bioavailability between the multiple generics and the brand name medications. He has failed to achieve seizure control and had worsened behaviors while on generic formulations in the past.\par We reviewed his current medications’ side effect profiles and talked about home management of breakthrough seizures with rescue medications.\par In addition, we also talked about seizure precautions, medication adherence, common seizure triggers, and the rationale behind monitoring trough levels.\par  \par 1) Parents to use the patient portal for fluid communications\par 2) Continue brand Depakote at 375/250/375 mg\par 3) Continue brand Lamictal at 50 mg BID\par 4) Continue generic Quetiapine at 150/25/150 mg\par 5) May use Quetiapine at 25-50 mg on a PRN basis as well.\par 6) PRN Clonazepam ODT 2 mg as rescue. \par 7) CBC, LFTs and anticonvulsant trough levels every 6-8 mo\par 8) Continue special education program and multimodal therapies\par 9) Follow up 6-8 mo \par  \par  \par Cory's mom understands plan, agrees and wants to move forward. All of her questions were answered.\par Cory's controlled substance history was obtained from the [place] state prescription monitoring program registry. \par I have reviewed how many seizures Cory had since last seen at [facility].\par I have reviewed the etiology/syndrome of Cory's epilepsy.   \par \par Minoo Roberto MD\par Pediatric Neurologist and Clinical Neurophysiologist\par Director Pediatric Epilepsy \par [facility]\par Professor of Neurology and Pediatrics, [facility] at [facility]\par \par \par

## 2023-04-25 ENCOUNTER — LABORATORY RESULT (OUTPATIENT)
Age: 23
End: 2023-04-25

## 2023-05-18 LAB
25(OH)D3 SERPL-MCNC: 10 NG/ML
ALBUMIN SERPL ELPH-MCNC: 4.1 G/DL
ALP BLD-CCNC: 65 U/L
ALT SERPL-CCNC: 6 U/L
ANION GAP SERPL CALC-SCNC: 10 MMOL/L
AST SERPL-CCNC: 10 U/L
BILIRUB SERPL-MCNC: 0.3 MG/DL
BUN SERPL-MCNC: 10 MG/DL
CALCIUM SERPL-MCNC: 9.6 MG/DL
CHLORIDE SERPL-SCNC: 103 MMOL/L
CO2 SERPL-SCNC: 29 MMOL/L
CREAT SERPL-MCNC: 0.8 MG/DL
EGFR: 128 ML/MIN/1.73M2
GLUCOSE SERPL-MCNC: 116 MG/DL
POTASSIUM SERPL-SCNC: 4.1 MMOL/L
PROT SERPL-MCNC: 5.9 G/DL
SODIUM SERPL-SCNC: 142 MMOL/L
T4 FREE SERPL-MCNC: 1.4 NG/DL
TSH SERPL-ACNC: 1.89 UIU/ML

## 2023-06-01 ENCOUNTER — APPOINTMENT (OUTPATIENT)
Dept: ENDOCRINOLOGY | Facility: CLINIC | Age: 23
End: 2023-06-01
Payer: COMMERCIAL

## 2023-06-01 PROCEDURE — 99441: CPT

## 2023-06-01 NOTE — DATA REVIEWED
[FreeTextEntry1] : 12/2022: TSH 2.76 Ft4 1.1 25 vit D 17  glucose 113 ( non fasting ) \par \par 5/2023: Glucose 116  crea 0.8   alk phos 65 calcium 9.6 TSH 1.89 Ft4 1.4 25 vit D 10

## 2023-06-01 NOTE — REVIEW OF SYSTEMS
[Constipation] : constipation [As Noted in HPI] : as noted in HPI [Recent Weight Gain (___ Lbs)] : no recent weight gain

## 2023-06-01 NOTE — ASSESSMENT
[FreeTextEntry1] : Cory is 21 yo M with known autism , who present for follow up  primary hypothyroidism and Vitamin D deficiency, with mother ( phone visit ) \par \par #primary hypothyroidism \par -   He is taking his T4 daily 75 mcg , 6:30 am,  from all other medications.\par - clinically euthyroid will continue same, discussed medications timing given he is on PPI \par \par # Osteopenia on xray.  Found when hx foot fracture L few years ago.  No further fractures.  Were unsuccessful doing spine xray in the past.   Vitamin D deficiency as he was not taking it daily not always accepting to take it \par mom will try again to give daily 2000 IU \par \par - encourage compliance and discussed fall precautions\par - next visit can do telehealth \par

## 2023-06-01 NOTE — REASON FOR VISIT
[Initial Evaluation] : an initial evaluation [Hypothyroidism] : hypothyroidism [Other___] : [unfilled] [Parent] : parent [Home] : at home, [unfilled] , at the time of the visit. [Medical Office: ([facility])___] : at the medical office located in  [Mother] : mother [FreeTextEntry2] : Dr Aida robledo

## 2023-06-01 NOTE — PHYSICAL EXAM
[Alert] : alert [No Acute Distress] : no acute distress [No Proptosis] : no proptosis [No Lid Lag] : no lid lag [Thyroid Not Enlarged] : the thyroid was not enlarged [No Respiratory Distress] : no respiratory distress [No Accessory Muscle Use] : no accessory muscle use [Regular Rhythm] : with a regular rhythm [No Edema] : no peripheral edema [No Stigmata of Cushings Syndrome] : no stigmata of Cushings Syndrome [de-identified] : limited exam

## 2023-06-01 NOTE — HISTORY OF PRESENT ILLNESS
[FreeTextEntry1] : Cory is 23 yo M with known autism , who present for follow up  for primary hypothyroidism and Vitamin D deficiency( phone visit with mom ) \par \par #primary hypothyroidism \par -   He is taking his T4 daily 75 mcg , 6:30 am,  from all other medications.\par -  Mother denies he has dry skin, , fatigue or cold intolerance. has some hair loss and chronic constipation related to dietary habits requiring Miralax . behavior is ok , more calm then in the past \par \par # Osteopenia on xray.  Found when hx foot fracture L few years ago.  No further fractures.  Were unsuccessful doing spine xray in the past.   Vitamin D deficiency being treated in t past but recently he was refusing it so was taking it less often , lately has been getting it every other day 2000 IU \par \par Has  gastritis, acid reflux, esophagitis, delayed gastric emptying.  Being followed by GI now dr Barrera  Recurrent Fe deficiency anemia.  has thalassemia trait), and recent thrombocytopenia attributed to ? Depakote \par \par

## 2023-06-21 ENCOUNTER — RX RENEWAL (OUTPATIENT)
Age: 23
End: 2023-06-21

## 2023-08-01 ENCOUNTER — LABORATORY RESULT (OUTPATIENT)
Age: 23
End: 2023-08-01

## 2023-08-01 ENCOUNTER — OUTPATIENT (OUTPATIENT)
Dept: OUTPATIENT SERVICES | Facility: HOSPITAL | Age: 23
LOS: 1 days | End: 2023-08-01
Payer: COMMERCIAL

## 2023-08-01 DIAGNOSIS — Z01.818 ENCOUNTER FOR OTHER PREPROCEDURAL EXAMINATION: Chronic | ICD-10-CM

## 2023-08-01 DIAGNOSIS — D56.3 THALASSEMIA MINOR: ICD-10-CM

## 2023-08-01 PROCEDURE — 82728 ASSAY OF FERRITIN: CPT

## 2023-08-01 PROCEDURE — 83550 IRON BINDING TEST: CPT

## 2023-08-01 PROCEDURE — 83540 ASSAY OF IRON: CPT

## 2023-08-01 PROCEDURE — 85652 RBC SED RATE AUTOMATED: CPT

## 2023-08-01 PROCEDURE — 85027 COMPLETE CBC AUTOMATED: CPT

## 2023-08-28 NOTE — ASU PREOP CHECKLIST - PATIENT SENT AT
[facility]  Obstetric History and Physical    Chief Complaint   Patient presents with    Contractions       Subjective     Patient is a 18 y.o. female  currently at 38w4d, who presents with labor.    Her prenatal care is complicated by factor V Leiden deficiency (on heparin).  Her previous obstetric/gynecological history is noted for is non-contributory.    The following portions of the patient's history were reviewed and updated as appropriate: current medications, allergies, past medical history, past surgical history, past family history, past social history, and problem list .   Social History     Socioeconomic History    Marital status: Single   Tobacco Use    Smoking status: Never    Smokeless tobacco: Never   Vaping Use    Vaping Use: Never used   Substance and Sexual Activity    Alcohol use: Never    Drug use: Never    Sexual activity: Yes     No past medical history on file.    Current Facility-Administered Medications:     acetaminophen (TYLENOL) tablet 650 mg, 650 mg, Oral, Q4H PRN, Jory Lamb DO    famotidine (PEPCID) injection 20 mg, 20 mg, Intravenous, Q12H **OR** famotidine (PEPCID) tablet 20 mg, 20 mg, Oral, Q12H, Jory Lamb DO    lactated ringers bolus 1,000 mL, 1,000 mL, Intravenous, Once PRN, Jory Lamb DO, Last Rate: 2,000 mL/hr at 23, Rate Change at 23    lactated ringers infusion, 125 mL/hr, Intravenous, Continuous, Jory Lamb DO, Last Rate: 125 mL/hr at 23, 125 mL/hr at 23    lidocaine (XYLOCAINE) 1 % injection 0.5 mL, 0.5 mL, Intradermal, Once PRN, Jory Lamb DO    mineral oil, , Topical, Once, Jory Lamb DO    morphine injection 2 mg, 2 mg, Intravenous, Q2H PRN, Jory Lamb DO, 2 mg at 23    morphine injection 2 mg, 2 mg, Intravenous, Q2H PRN, Jory Lamb DO    ondansetron (ZOFRAN) tablet 4 mg, 4 mg, Oral, Q6H PRN  **OR** ondansetron (ZOFRAN) injection 4 mg, 4 mg, Intravenous, Q6H PRN, Jory Lamb,     [START ON 8/28/2023] oxytocin (PITOCIN) 30 units in 0.9% sodium chloride 500 mL (premix), 2-20 kylah-units/min, Intravenous, Titrated, Jory Lamb,     sodium chloride (NS) irrigation solution 1,000 mL, 1,000 mL, Irrigation, Once PRN, Jory Lamb, DO    sodium chloride 0.9 % flush 10 mL, 10 mL, Intravenous, Q12H, Jory Lamb, DO    sodium chloride 0.9 % flush 10 mL, 10 mL, Intravenous, PRN, Jory Lamb, DO    sodium chloride 0.9 % infusion 40 mL, 40 mL, Intravenous, PRN, Jory Lamb,     terbutaline (BRETHINE) injection 0.2 mg, 0.2 mg, Subcutaneous, PRN, Jory Lamb, DO  No Known Allergies  History reviewed. No pertinent surgical history.      Prenatal Information:   Maternal Prenatal Labs  Blood Type ABO Type   Date Value Ref Range Status   08/27/2023 O  Final      Rh Status RH type   Date Value Ref Range Status   08/27/2023 Positive  Final      Antibody Screen Antibody Screen   Date Value Ref Range Status   08/27/2023 Negative  Final      Rapid Urine Drug Screen Barbiturates Screen, Urine   Date Value Ref Range Status   08/27/2023 Negative Negative Final     Benzodiazepine Screen, Urine   Date Value Ref Range Status   08/27/2023 Negative Negative Final     Methadone Screen, Urine   Date Value Ref Range Status   08/27/2023 Negative Negative Final     Opiate Screen   Date Value Ref Range Status   08/27/2023 Negative Negative Final     THC, Screen, Urine   Date Value Ref Range Status   08/27/2023 Negative Negative Final     Cocaine Screen, Urine   Date Value Ref Range Status   08/27/2023 Negative Negative Final     Amphetamine Screen, Urine   Date Value Ref Range Status   08/27/2023 Negative Negative Final     Propoxyphene Screen   Date Value Ref Range Status   08/27/2023 Negative Negative Final     Buprenorphine, Screen, Urine   Date  Value Ref Range Status   08/27/2023 Negative Negative Final     Methamphetamine, Ur   Date Value Ref Range Status   08/27/2023 Negative Negative Final     Oxycodone Screen, Urine   Date Value Ref Range Status   08/27/2023 Negative Negative Final     Tricyclic Antidepressants Screen   Date Value Ref Range Status   08/27/2023 Negative Negative Final      Group B Strep Culture No results found for: GBSANTIGEN           External Prenatal Results       Pregnancy Outside Results - Transcribed From Office Records - See Scanned Records For Details       Test Value Date Time    ABO  O  08/27/23 2014    Rh  Positive  08/27/23 2014    Antibody Screen  Negative  08/27/23 1947    Varicella IgG       Rubella       Hgb  10.3 g/dL 08/27/23 1947    Hct  32.6 % 08/27/23 1947    Glucose Fasting GTT       Glucose Tolerance Test 1 hour       Glucose Tolerance Test 3 hour       Gonorrhea (discrete) ^ Negative  08/18/23     Chlamydia (discrete) ^ Negative  08/18/23     RPR       VDRL       Syphilis Antibody       HBsAg       Herpes Simplex Virus PCR       Herpes Simplex VIrus Culture       HIV       Hep C RNA Quant PCR       Hep C Antibody       AFP       Group B Strep ^ Negative  08/18/23     GBS Susceptibility to Clindamycin       GBS Susceptibility to Erythromycin       Fetal Fibronectin       Genetic Testing, Maternal Blood                 Drug Screening       Test Value Date Time    Urine Drug Screen       Amphetamine Screen  Negative  08/27/23 1936    Barbiturate Screen  Negative  08/27/23 1936    Benzodiazepine Screen  Negative  08/27/23 1936    Methadone Screen  Negative  08/27/23 1936    Phencyclidine Screen  Negative  08/27/23 1936    Opiates Screen  Negative  08/27/23 1936    THC Screen  Negative  08/27/23 1936    Cocaine Screen       Propoxyphene Screen  Negative  08/27/23 1936    Buprenorphine Screen  Negative  08/27/23 1936    Methamphetamine Screen       Oxycodone Screen  Negative  08/27/23 1936    Tricyclic Antidepressants  Screen  Negative  23              Legend    ^: Historical                              Past OB History:     OB History    Para Term  AB Living   1 0 0 0 0 0   SAB IAB Ectopic Molar Multiple Live Births   0 0 0 0 0 0      # Outcome Date GA Lbr Pete/2nd Weight Sex Delivery Anes PTL Lv   1 Current                Past Medical History: No past medical history on file.   Past Surgical History History reviewed. No pertinent surgical history.   Family History: Family History   Problem Relation Age of Onset    No Known Problems Mother     Diabetes Father     Depression Father       Social History:  reports that she has never smoked. She has never used smokeless tobacco.   reports no history of alcohol use.   reports no history of drug use.        Review of Systems-all negative except as noted in HPI      Objective     Vital Signs Range for the last 24 hours  Temperature: Temp:  [97.2 °F (36.2 °C)-97.9 °F (36.6 °C)] 97.9 °F (36.6 °C)   Temp Source: Temp src: Temporal   BP: BP: (132-143)/(72-81) 143/81   Pulse: Heart Rate:  [80-86] 86   Respirations: Resp:  [18] 18   Weight: Weight:  [67.1 kg (148 lb)] 67.1 kg (148 lb)     Physical Examination: General appearance - alert, well appearing, and in no distress, oriented to person, place, and time, normal appearing weight and well hydrated  Mental status - alert, oriented to person, place, and time, normal mood, behavior, speech, dress, motor activity, and thought processes, affect appropriate to mood  Neck - supple, no significant adenopathy  Chest - clear to auscultation, no wheezes, rales or rhonchi, symmetric air entry, no tachypnea, retractions or cyanosis  Heart - normal rate, regular rhythm, normal S1, S2, no murmurs, rubs, clicks or gallops  Abdomen - soft, nontender, gravid uterus, no masses or organomegaly  no rebound tenderness noted,   Pelvic - normal external genitalia, vulva, vagina, cervix, uterus and adnexa  Neurological - alert, oriented,  normal speech, no focal findings or movement disorder noted  Musculoskeletal - no joint tenderness, deformity or swelling  Extremities - peripheral pulses normal, no pedal edema, no clubbing or cyanosis  Skin - normal coloration and turgor, no rashes, no suspicious skin lesions noted        Cervix: Exam by:     Dilation:     Effacement:     Station:       Laboratory Results:   Lab Results (last 24 hours)       Procedure Component Value Units Date/Time    Urine Drug Screen - Urine, Clean Catch [[id number]]  (Normal) Collected: 08/27/23 1936    Specimen: Urine, Clean Catch Updated: 08/27/23 2121     THC, Screen, Urine Negative     Phencyclidine (PCP), Urine Negative     Cocaine Screen, Urine Negative     Methamphetamine, Ur Negative     Opiate Screen Negative     Amphetamine Screen, Urine Negative     Benzodiazepine Screen, Urine Negative     Tricyclic Antidepressants Screen Negative     Methadone Screen, Urine Negative     Barbiturates Screen, Urine Negative     Oxycodone Screen, Urine Negative     Propoxyphene Screen Negative     Buprenorphine, Screen, Urine Negative    Narrative:      Cutoff For Drugs Screened:    Amphetamines               500 ng/ml  Barbiturates               200 ng/ml  Benzodiazepines            150 ng/ml  Cocaine                    150 ng/ml  Methadone                  200 ng/ml  Opiates                    100 ng/ml  Phencyclidine               25 ng/ml  THC                            50 ng/ml  Methamphetamine            500 ng/ml  Tricyclic Antidepressants  300 ng/ml  Oxycodone                  100 ng/ml  Propoxyphene               300 ng/ml  Buprenorphine               10 ng/ml    The normal value for all drugs tested is negative. This report includes unconfirmed screening results, with the cutoff values listed, to be used for medical treatment purposes only.  Unconfirmed results must not be used for non-medical purposes such as employment or legal testing.  Clinical consideration should be  applied to any drug of abuse test, particularly when unconfirmed results are used.      Fentanyl, Urine - Urine, Clean Catch [[id number]] Collected: 08/27/23 1936    Specimen: Urine, Clean Catch Updated: 08/27/23 2109    Protime-INR [[id number]]  (Abnormal) Collected: 08/27/23 1947    Specimen: Blood Updated: 08/27/23 2036     Protime 12.6 Seconds      INR 0.89    Narrative:      Suggested INR therapeutic range for stable oral anticoagulant therapy:    Low Intensity therapy:   1.5-2.0  Moderate Intensity therapy:   2.0-3.0  High Intensity therapy:   2.5-4.0    aPTT [[id number]]  (Abnormal) Collected: 08/27/23 1947    Specimen: Blood Updated: 08/27/23 2034     PTT 26.1 seconds     Narrative:      PTT Heparin Therapeutic Range:  59 - 95 seconds      CBC & Differential [[id number]]  (Abnormal) Collected: 08/27/23 1947    Specimen: Blood Updated: 08/27/23 2023    Narrative:      The following orders were created for panel order CBC & Differential.  Procedure                               Abnormality         Status                     ---------                               -----------         ------                     CBC Auto Differential[[id number]]        Abnormal            Final result                 Please view results for these tests on the individual orders.    CBC Auto Differential [[id number]]  (Abnormal) Collected: 08/27/23 1947    Specimen: Blood Updated: 08/27/23 2023     WBC 13.79 10*3/mm3      RBC 3.65 10*6/mm3      Hemoglobin 10.3 g/dL      Hematocrit 32.6 %      MCV 89.3 fL      MCH 28.2 pg      MCHC 31.6 g/dL      RDW 13.6 %      RDW-SD 43.9 fl      MPV 9.7 fL      Platelets 319 10*3/mm3      Neutrophil % 77.2 %      Lymphocyte % 12.8 %      Monocyte % 7.3 %      Eosinophil % 0.6 %      Basophil % 0.5 %      Immature Grans % 1.6 %      Neutrophils, Absolute 10.65 10*3/mm3      Lymphocytes, Absolute 1.76 10*3/mm3      Monocytes, Absolute 1.01 10*3/mm3      Eosinophils, Absolute 0.08 10*3/mm3      Basophils,  Absolute 0.07 10*3/mm3      Immature Grans, Absolute 0.22 10*3/mm3      nRBC 0.0 /100 WBC     Group B Streptococcus Culture - Swab, Vaginal/Rectum [[id number]] Resulted: 08/18/23    Specimen: Swab from Vaginal/Rectum Updated: 08/27/23 1656     External Strep Group B Ag Negative    Chlamydia trachomatis, Neisseria gonorrhoeae, PCR w/ confirmation - Swab, Vagina [[id number]] Resulted: 08/18/23    Specimen: Swab from Vagina Updated: 08/27/23 1656     External Chlamydia Screen Negative    Gonorrhea Screen - Swab, [[id number]] Resulted: 08/18/23    Specimen: Swab Updated: 08/27/23 1656     External Gonorrhea Screen Negative          Radiology Review:   Imaging Results (Last 72 Hours)       ** No results found for the last 72 hours. **              Assessment & Plan       Pregnant    Pregnancy      Assessment & Plan    Assessment:  1.  Intrauterine pregnancy at 38w4d weeks gestation with reassuring fetal status.    2.  labor  without ROM  3.  Obstetrical history significant for is noncontributory.  4.  GBS status: No results found for: GBSANTIGEN    Plan:  1. fetal and uterine monitoring  continuously and expectant management  2. Plan of care has been reviewed with patient   3.  Risks, benefits of treatment plan have been discussed.  4.  All questions have been answered.        Jory Lamb DO  8/27/2023  21:50 EDT     27-Feb-2019 07:42

## 2023-10-03 RX ORDER — QUETIAPINE FUMARATE 25 MG/1
25 TABLET ORAL
Qty: 30 | Refills: 5 | Status: ACTIVE | COMMUNITY
Start: 2023-02-13 | End: 1900-01-01

## 2023-12-06 LAB
T4 FREE SERPL-MCNC: 1 NG/DL
TSH SERPL-ACNC: 2.62 UIU/ML

## 2023-12-07 LAB — 25(OH)D3 SERPL-MCNC: 19 NG/ML

## 2023-12-11 ENCOUNTER — APPOINTMENT (OUTPATIENT)
Dept: ENDOCRINOLOGY | Facility: CLINIC | Age: 23
End: 2023-12-11
Payer: COMMERCIAL

## 2023-12-11 PROCEDURE — 99212 OFFICE O/P EST SF 10 MIN: CPT | Mod: 95

## 2023-12-21 DIAGNOSIS — D56.3 THALASSEMIA MINOR: ICD-10-CM

## 2023-12-28 ENCOUNTER — APPOINTMENT (OUTPATIENT)
Dept: NEUROLOGY | Facility: CLINIC | Age: 23
End: 2023-12-28
Payer: COMMERCIAL

## 2023-12-28 DIAGNOSIS — G40.019 LOCALIZATION-RELATED (FOCAL) (PARTIAL) IDIOPATHIC EPILEPSY AND EPILEPTIC SYNDROMES WITH SEIZURES OF LOCALIZED ONSET, INTRACTABLE, W/OUT STATUS EPILEPTICUS: ICD-10-CM

## 2023-12-28 PROCEDURE — 99215 OFFICE O/P EST HI 40 MIN: CPT | Mod: 95

## 2023-12-28 NOTE — HISTORY OF PRESENT ILLNESS
[FreeTextEntry1] : Telemedicine visit: Patient Location at time of Video Visit: Cory's mom was home during the visit. Physician Location at time of Visit: [address] [facility] 8[facility] Floor Physician Practice office Other Participants Present: None.  I obtained parent consent and agreement for this video encounter. Additionally, I reviewed with the parent and addressed all questions regarding the disposition plan and follow-up instructions including any pertinent findings. The parent has acknowledged understanding of this information. I informed the parent that a copy of the after-visit summary for this visit is available for her to refer to within the "Follow my chart" [facility] portal.  CC: 23 y old ex premature non identical twin A right handed young man with autism, hypothyroidism and non lesional focal epilepsy. Telemedicine video follow up visit.  Interval history: Since last seen, Cory has been doing well. He remains seizure free since 8/2014. For seizure control, he remains on unchanged doses of brand Depakote and brand Lamictal. He is not having side effects to these medications. His anticonvulsants are also likely contributing to regulate his mood and behavior to a certain degree. In the past, habitual seizures were subtle and there is a concern about undetected seizures if we lower the anticonvulsants. Blood tests are done regularly to maintain therapeutic levels and to monitor for potential side effects. He has long standing history of autism related behavioral symptoms, for which he is receiving generic Quetiapine, without side effects.  General health is good, but he has hypothyroidism, dyspepsia, and KIERAN. He is being followed by Endocrinology (takes Levothyroxine), and a GI specialist. Sleep is good, through the night. Does not snore. Goes to adult "dayhab" program.  Current CNS medications: Brand Lamictal 50 mg BID. Most recent trough level 5.4 Brand Depakote 250/250/375 mg. Most recent trough level 68 Quetiapine generic 150/25/150 mg. PRN Quetiapine 25 to 50 mg for behavioral episodes PRN Clonazepam 2 mg ODT as rescue (not needed for years)    HPI: Cory had been followed at [facility]. He is a fraternal twin A, with early onset developmental lags followed by subsequent diagnosis of autism, and non lesional focal epilepsy. A brain MRI was normal ([facility] 1/2011). Although suggested, a formal genetic evaluation never completed. He has also been seen by Peds Rheumatology and has a diagnosis of Sjogren's and hypothyroidism. Also has long standing history of GI symptoms, followed at [facility]. His epilepsy was medically difficult to control for several years, until he was placed on combination of brand Depakote and brand Lamictal He has long standing history of autism related behavioral symptoms, for which he is receiving generic Quetiapine, without side effects. Good general health, with the exception of hypothyroidism, dyspepsia and KIERAN.   Prior CNS medications: Generic Strattera. Ineffective Generic Olanzapine. Ineffective Generic Lamotrigine. Side effects. Generic Valproic acid. Side effects.  Review of systems General: Stable weight Skin: No rashes, lumps, color change, changes in hair/nails Head: No head injury Eyes: No discharges Ears: No discharges Nose/Sinuses: No congestion, discharge, epistaxis Mouth/Throat: No sore throat, hoarseness Neck: No lumps, stiffness Respiratory: No cough, hemoptysis, snoring Cardiac: No edema, dyspnea, orthopnea GI: No diarrhea. KIERAN reflux. : No hematuria Musculoskeletal: No lesions Neuro: No recent seizures. Psych: Behavioral symptoms  Physical Exam: Deferred, Cory was not available at the time of the video visit.  Assessment: 23 y old ex premature non identical twin A right handed young man with autism, hypothyroidism and non lesional focal epilepsy. Good seizure control and no side effects while on combination of brand Lamictal and brand Depakote. Good control of behavioral symptoms while on generic Quetiapine.  Plan: Cory's televisit today had a duration of 40 minutes (>50% of which was spent in direct counseling and coordination of his care). I personally reviewed all pertinent aspects of Cory's medical history, medical records, test results, recent developments, and then delineated next steps for his neurological care. Cory's mom and I reviewed autism, epilepsy in general, different treatment modalities, co morbidities and overall prognosis. Epilepsy is a chronic illness with potential for injury that poses a threat to life or bodily function. We discussed the differences in bioavailability between the multiple generics and the brand name medications. He has failed to achieve seizure control and had worsened behaviors while on generic formulations in the past. We reviewed his current medications' side effect profiles and talked about home management of breakthrough seizures with rescue medications. In addition, we also talked about seizure precautions, medication adherence, common seizure triggers, and the rationale behind monitoring trough levels.  1) Parents to use the patient portal for fluid communications 2) Continue brand Depakote at 375/250/375 mg 3) Continue brand Lamictal at 50 mg BID 4) Continue generic Quetiapine at 150/25/150 mg 5) May use Quetiapine at 25-50 mg on a PRN basis as well. 6) PRN Clonazepam ODT 2 mg as rescue. 7) CBC, LFTs and anticonvulsant trough levels every 6-8 mo. Due next 6/2024 8) Continue special education program and multimodal therapies 9) Follow up 10-12 mo   Cory's mom understands plan, agrees and wants to move forward. All of her questions were answered. Cory's controlled substance history was obtained from the [place] state prescription monitoring program registry. I have reviewed how many seizures Cory had since last seen. I have reviewed the etiology/syndrome of Cory's epilepsy.   Minoo Roberto MD Pediatric Neurologist and Clinical Neurophysiologist Director Pediatric Epilepsy [facility] at [facility] Clinical Professor of Neurology and Pediatrics, [facility] at [facility]

## 2024-01-24 ENCOUNTER — RX CHANGE (OUTPATIENT)
Age: 24
End: 2024-01-24

## 2024-01-24 RX ORDER — LAMOTRIGINE 25 MG/1
25 TABLET ORAL
Qty: 120 | Refills: 6 | Status: ACTIVE | COMMUNITY
Start: 2022-09-29 | End: 1900-01-01

## 2024-03-05 ENCOUNTER — RX RENEWAL (OUTPATIENT)
Age: 24
End: 2024-03-05

## 2024-03-05 RX ORDER — DIVALPROEX SODIUM 125 MG/1
125 CAPSULE ORAL
Qty: 240 | Refills: 6 | Status: ACTIVE | COMMUNITY
Start: 2017-08-13 | End: 1900-01-01

## 2024-04-19 RX ORDER — QUETIAPINE FUMARATE 50 MG/1
50 TABLET ORAL
Qty: 210 | Refills: 5 | Status: ACTIVE | COMMUNITY
Start: 2022-12-21 | End: 1900-01-01

## 2024-04-26 RX ORDER — FAMOTIDINE 40 MG/1
40 TABLET, FILM COATED ORAL
Qty: 30 | Refills: 6 | Status: ACTIVE | COMMUNITY
Start: 2023-02-08 | End: 1900-01-01

## 2024-04-26 RX ORDER — PANTOPRAZOLE 40 MG/1
40 TABLET, DELAYED RELEASE ORAL DAILY
Qty: 30 | Refills: 6 | Status: ACTIVE | COMMUNITY
Start: 2023-02-08 | End: 1900-01-01

## 2024-04-30 ENCOUNTER — NON-APPOINTMENT (OUTPATIENT)
Age: 24
End: 2024-04-30

## 2024-05-11 ENCOUNTER — LABORATORY RESULT (OUTPATIENT)
Age: 24
End: 2024-05-11

## 2024-05-11 ENCOUNTER — OUTPATIENT (OUTPATIENT)
Dept: OUTPATIENT SERVICES | Facility: HOSPITAL | Age: 24
LOS: 1 days | End: 2024-05-11
Payer: COMMERCIAL

## 2024-05-11 DIAGNOSIS — Z01.818 ENCOUNTER FOR OTHER PREPROCEDURAL EXAMINATION: Chronic | ICD-10-CM

## 2024-05-11 PROCEDURE — 36415 COLL VENOUS BLD VENIPUNCTURE: CPT

## 2024-05-11 PROCEDURE — 84443 ASSAY THYROID STIM HORMONE: CPT

## 2024-05-11 PROCEDURE — 85027 COMPLETE CBC AUTOMATED: CPT

## 2024-05-11 PROCEDURE — 83550 IRON BINDING TEST: CPT

## 2024-05-11 PROCEDURE — 84590 ASSAY OF VITAMIN A: CPT

## 2024-05-11 PROCEDURE — 84630 ASSAY OF ZINC: CPT

## 2024-05-11 PROCEDURE — 84439 ASSAY OF FREE THYROXINE: CPT

## 2024-05-11 PROCEDURE — 82728 ASSAY OF FERRITIN: CPT

## 2024-05-11 PROCEDURE — 85652 RBC SED RATE AUTOMATED: CPT

## 2024-05-11 PROCEDURE — 83540 ASSAY OF IRON: CPT

## 2024-05-11 PROCEDURE — 82306 VITAMIN D 25 HYDROXY: CPT

## 2024-05-11 PROCEDURE — 85045 AUTOMATED RETICULOCYTE COUNT: CPT

## 2024-05-13 LAB
25(OH)D3 SERPL-MCNC: 16 NG/ML
ERYTHROCYTE [SEDIMENTATION RATE] IN BLOOD BY WESTERGREN METHOD: 0 MM/HR
FERRITIN SERPL-MCNC: 29 NG/ML
IRON SATN MFR SERPL: 34 %
IRON SERPL-MCNC: 88 UG/DL
T4 FREE SERPL-MCNC: 1 NG/DL
TIBC SERPL-MCNC: 257 UG/DL
TSH SERPL-ACNC: 1.01 UIU/ML
UIBC SERPL-MCNC: 169 UG/DL

## 2024-05-14 DIAGNOSIS — D56.3 THALASSEMIA MINOR: ICD-10-CM

## 2024-05-15 DIAGNOSIS — D56.3 THALASSEMIA MINOR: ICD-10-CM

## 2024-05-15 LAB — ZINC SERPL-MCNC: 62 UG/DL

## 2024-05-20 LAB — VIT A SERPL-MCNC: 9.2 UG/DL

## 2024-05-31 ENCOUNTER — LABORATORY RESULT (OUTPATIENT)
Age: 24
End: 2024-05-31

## 2024-05-31 ENCOUNTER — APPOINTMENT (OUTPATIENT)
Age: 24
End: 2024-05-31
Payer: COMMERCIAL

## 2024-05-31 VITALS
RESPIRATION RATE: 22 BRPM | TEMPERATURE: 98 F | DIASTOLIC BLOOD PRESSURE: 68 MMHG | SYSTOLIC BLOOD PRESSURE: 112 MMHG | WEIGHT: 129.19 LBS | HEART RATE: 99 BPM

## 2024-05-31 DIAGNOSIS — D56.3 THALASSEMIA MINOR: ICD-10-CM

## 2024-05-31 DIAGNOSIS — Z76.89 PERSONS ENCOUNTERING HEALTH SERVICES IN OTHER SPECIFIED CIRCUMSTANCES: ICD-10-CM

## 2024-05-31 PROCEDURE — 99215 OFFICE O/P EST HI 40 MIN: CPT

## 2024-06-02 LAB
HCT VFR BLD CALC: 31.5 %
HGB BLD-MCNC: 9.8 G/DL
MCHC RBC-ENTMCNC: 20.9 PG
MCHC RBC-ENTMCNC: 31.1 G/DL
MCV RBC AUTO: 67 FL
PLATELET # BLD AUTO: 181 K/UL
PMV BLD: 10.8 FL
RBC # BLD: 4.7 M/UL
RBC # FLD: 14.7 %
RETICS # AUTO: 0.5 %
RETICS AGGREG/RBC NFR: 24 K/UL
WBC # FLD AUTO: 2.68 K/UL

## 2024-06-02 NOTE — END OF VISIT
[FreeTextEntry3] : pt seen and examined. autism and chronic h/o vomtiing/reflux.  Ferritin trending down.   he has beta thal trait with a baseline hgb around 10 gdl and MCV low 70s, and he was noted to have a hgb/mcv trending down as well.  Mom reports his hair is falling out- no patches off hair loss noted or thinning hair on exam. TSH and FT4 normal- has f/u with endo in 2 weeks.  zinc level normal.  vitamin A level decreased- will repeat today for confirmation.  Currently on an MVI.  VIt D level low- taking supplement. [Time Spent: ___ minutes] : I have spent [unfilled] minutes of time on the encounter.

## 2024-06-02 NOTE — REVIEW OF SYSTEMS
[Decreased Appetite] : decreased appetite [Weight Change] : weight change [Anemia] : anemia [Emesis] : emesis [Fever] : no fever [Fatigue] : no fatigue [Rash] : no rash [Petechiae] : no petechiae [Ecchymoses] : no ecchymoses [Jaundice] : no jaundice [Icterus] : no icterus [Otitis Media] : no otitis media [Nasal Discharge] : no nasal discharge [Epistaxis] : no epistaxis [Sore Throat] : no sore throat [Dysphagia] : no dysphagia [Mouth Ulcers] : no mouth ulcers [Bleeding] : no bleeding [Bruising] : no bruising [Adenopathy] : no adenopathy [Dyspnea] : no dyspnea [Cough] : no cough [Stridor] : no stridor [Murmur] : no murmur [Abdominal Pain] : no abdominal pain [Constipation] : no constipation [Diarrhea] : no diarrhea [Dysuria] : no dysuria [Hematuria] : no hematuria [Joint Pain] : no joint pain [Joint Swelling] : no joint swelling [Myalgia] : no myalgia [Seizure] : no seizure [Irritable] : not irritable

## 2024-06-02 NOTE — HISTORY OF PRESENT ILLNESS
[de-identified] : 22 y/o male with h/o autism, seizures, hypothyroidism, reflux/vomiting, beta thal trait and iron deficiency anemia here for f/u for Feraheme infusion.  Last infusion was March 2023. Mother states that since last visit patient continues to vomit on a daily basis with worsening symptoms over the past month.  Denies dark colored emesis.  Reports that emesis consists of mostly food and clear liquids at times. Denies recent fever or infections.  Denies unusual bruising or bleeding.  No reports of joint pain or joint swelling.  States that she has noticed patient's hair thinning over the past several months.  Notes that diet consist of junk food and reports that patient continues to lose weight.  Patient is scheduled to see GI on 6/12 and f/u with endo on 6/13  MEDS:  Famotidine 40mg daily Pantoprazole 40mg daily  Carafate 10ml QID  Depakote 375mg am, 250mg pm, 375 at bedtime Seroquel 150mg am, 25mg pm, 150mg at bedtime  Levothyroxine 75mcg Buspar 5mg daily  Metamucil daily Vit D 2,000 units daily Biotin Novaferrum 50 mg daily

## 2024-06-02 NOTE — CONSULT LETTER
[Dear  ___] : Dear  [unfilled], [Courtesy Letter:] : I had the pleasure of seeing your patient, [unfilled], in my office today. [Please see my note below.] : Please see my note below. [Consult Closing:] : Thank you very much for allowing me to participate in the care of this patient.  If you have any questions, please do not hesitate to contact me. [Sincerely,] : Sincerely, [FreeTextEntry2] : Dr Rivera [FreeTextEntry3] : Carolina Espinal MD Pediatric Hematology/Oncology [place] [phone]

## 2024-06-10 LAB
25(OH)D3 SERPL-MCNC: 20 NG/ML
ALBUMIN SERPL ELPH-MCNC: 4.1 G/DL
ALP BLD-CCNC: 66 U/L
ALT SERPL-CCNC: 5 U/L
ANION GAP SERPL CALC-SCNC: 13 MMOL/L
AST SERPL-CCNC: 13 U/L
BILIRUB SERPL-MCNC: 0.4 MG/DL
BUN SERPL-MCNC: 11 MG/DL
CALCIUM SERPL-MCNC: 9.5 MG/DL
CHLORIDE SERPL-SCNC: 102 MMOL/L
CO2 SERPL-SCNC: 25 MMOL/L
CREAT SERPL-MCNC: 0.9 MG/DL
EGFR: 122 ML/MIN/1.73M2
GLUCOSE SERPL-MCNC: 94 MG/DL
POTASSIUM SERPL-SCNC: 3.9 MMOL/L
PROT SERPL-MCNC: 6.2 G/DL
SODIUM SERPL-SCNC: 140 MMOL/L
T4 FREE SERPL-MCNC: 1.2 NG/DL
TSH SERPL-ACNC: 0.92 UIU/ML

## 2024-06-13 ENCOUNTER — APPOINTMENT (OUTPATIENT)
Dept: ENDOCRINOLOGY | Facility: CLINIC | Age: 24
End: 2024-06-13
Payer: COMMERCIAL

## 2024-06-13 DIAGNOSIS — E55.9 VITAMIN D DEFICIENCY, UNSPECIFIED: ICD-10-CM

## 2024-06-13 PROCEDURE — 99441: CPT

## 2024-06-13 RX ORDER — LEVOTHYROXINE SODIUM 0.07 MG/1
75 TABLET ORAL
Qty: 30 | Refills: 6 | Status: ACTIVE | COMMUNITY
Start: 2019-06-17 | End: 1900-01-01

## 2024-06-13 NOTE — HISTORY OF PRESENT ILLNESS
[FreeTextEntry1] : Cory is 22 yo M with known autism , who present for follow up  for primary hypothyroidism and Vitamin D deficiency  #primary hypothyroidism  -   He is taking his T4 daily 75 mcg , 6:30 am,  from all other medications. -  Mother denies he has dry skin, , fatigue or cold intolerance.  chronic constipation related to dietary habits requiring Miralax . behavior is ok , more calm then in the past  - 6/2024: TFt ok on Lt4 75 mcg daily , he is having more hair loss and GI problems with vomiting going soon for another EGD   # Osteopenia on xray.  Found when hx foot fracture L few years ago.  No further fractures.  Were unsuccessful doing spine xray in the past.     Vitamin D deficiency, lately has been getting it every other day 2000 IU ( mom crushing it _  Has  gastritis, acid reflux, esophagitis, delayed gastric emptying.  Being followed by GI now dr Barrera  Recurrent Fe deficiency anemia.  has thalassemia trait), and t thrombocytopenia

## 2024-06-13 NOTE — ASSESSMENT
[FreeTextEntry1] : Cory is 22 yo M with known autism , who present for follow up primary hypothyroidism and Vitamin D deficiency, with mother ( phone     #primary hypothyroidism - He is taking his T4 daily 75 mcg , 6:30 am,  from all other medications. - clinically and biochemically euthyroid will continue same    # Osteopenia on xray. Found when hx foot fracture L few years ago. No further fractures. Were unsuccessful doing spine xray in the past. Vitamin D deficiency improving and now accepting more the meds  mom will continue to  try again to give daily 2000 IU - 25 vi td 20 , will up to 4000 iu alternating with 2000 IU  - encourage compliance and discussed fall precautions - next visit can do telehealth  #  hair loss  likely from anemia , on iron and will also get infusion

## 2024-06-13 NOTE — DATA REVIEWED
[FreeTextEntry1] : 12/2022: TSH 2.76 Ft4 1.1 25 vit D 17  glucose 113 ( non fasting )   5/2023: Glucose 116  crea 0.8   alk phos 65 calcium 9.6 TSH 1.89 Ft4 1.4 25 vit D 10  12/2023: TSH 2.62  ft4 1 25 vit D 19   6/2024: TSH 0.92  Ft4 1.2   25 vit d 20 cmp ok

## 2024-06-13 NOTE — REVIEW OF SYSTEMS
[Recent Weight Gain (___ Lbs)] : no recent weight gain [Constipation] : constipation [As Noted in HPI] : as noted in HPI

## 2024-06-13 NOTE — REASON FOR VISIT
[Home] : at home, [unfilled] , at the time of the visit. [Medical Office: ([facility])___] : at the medical office located in  [FreeTextEntry3] : mom  [Follow - Up] : a follow-up visit [Hypothyroidism] : hypothyroidism [Other___] : [unfilled] [Parent] : parent [FreeTextEntry2] : Dr Aida robledo

## 2024-06-19 ENCOUNTER — OUTPATIENT (OUTPATIENT)
Dept: OUTPATIENT SERVICES | Facility: HOSPITAL | Age: 24
LOS: 1 days | End: 2024-06-19
Payer: COMMERCIAL

## 2024-06-19 ENCOUNTER — LABORATORY RESULT (OUTPATIENT)
Age: 24
End: 2024-06-19

## 2024-06-19 ENCOUNTER — APPOINTMENT (OUTPATIENT)
Age: 24
End: 2024-06-19
Payer: COMMERCIAL

## 2024-06-19 VITALS
SYSTOLIC BLOOD PRESSURE: 106 MMHG | DIASTOLIC BLOOD PRESSURE: 70 MMHG | OXYGEN SATURATION: 100 % | BODY MASS INDEX: 18.76 KG/M2 | RESPIRATION RATE: 18 BRPM | WEIGHT: 126.66 LBS | HEART RATE: 99 BPM | HEIGHT: 68.9 IN | TEMPERATURE: 97.6 F

## 2024-06-19 VITALS
RESPIRATION RATE: 20 BRPM | DIASTOLIC BLOOD PRESSURE: 72 MMHG | SYSTOLIC BLOOD PRESSURE: 105 MMHG | HEART RATE: 91 BPM | TEMPERATURE: 98 F

## 2024-06-19 DIAGNOSIS — K21.9 GASTRO-ESOPHAGEAL REFLUX DISEASE W/OUT ESOPHAGITIS: ICD-10-CM

## 2024-06-19 DIAGNOSIS — D50.9 IRON DEFICIENCY ANEMIA, UNSPECIFIED: ICD-10-CM

## 2024-06-19 DIAGNOSIS — E03.9 HYPOTHYROIDISM, UNSPECIFIED: ICD-10-CM

## 2024-06-19 DIAGNOSIS — Z01.818 ENCOUNTER FOR OTHER PREPROCEDURAL EXAMINATION: Chronic | ICD-10-CM

## 2024-06-19 DIAGNOSIS — F84.0 AUTISTIC DISORDER: ICD-10-CM

## 2024-06-19 PROCEDURE — 99214 OFFICE O/P EST MOD 30 MIN: CPT

## 2024-06-19 PROCEDURE — 36415 COLL VENOUS BLD VENIPUNCTURE: CPT

## 2024-06-19 PROCEDURE — 85027 COMPLETE CBC AUTOMATED: CPT

## 2024-06-19 PROCEDURE — 96367 TX/PROPH/DG ADDL SEQ IV INF: CPT

## 2024-06-19 PROCEDURE — 96365 THER/PROPH/DIAG IV INF INIT: CPT

## 2024-06-19 PROCEDURE — 99214 OFFICE O/P EST MOD 30 MIN: CPT | Mod: 25

## 2024-06-19 RX ORDER — DIPHENHYDRAMINE HCL 50 MG
25 CAPSULE ORAL ONCE
Refills: 0 | Status: COMPLETED | OUTPATIENT
Start: 2024-06-19 | End: 2024-06-19

## 2024-06-19 RX ORDER — ONDANSETRON 2 MG/ML
8 INJECTION, SOLUTION INTRAMUSCULAR; INTRAVENOUS ONCE
Refills: 0 | Status: COMPLETED | OUTPATIENT
Start: 2024-06-19 | End: 2024-06-19

## 2024-06-19 RX ORDER — FERUMOXYTOL 510 MG/17ML
510 INJECTION INTRAVENOUS ONCE
Refills: 0 | Status: COMPLETED | OUTPATIENT
Start: 2024-06-19 | End: 2024-06-19

## 2024-06-19 RX ADMIN — Medication 25 MILLIGRAM(S): at 10:15

## 2024-06-19 RX ADMIN — Medication 101 MILLIGRAM(S): at 09:45

## 2024-06-19 RX ADMIN — ONDANSETRON 108 MILLIGRAM(S): 2 INJECTION, SOLUTION INTRAMUSCULAR; INTRAVENOUS at 09:15

## 2024-06-19 RX ADMIN — FERUMOXYTOL 156 MILLIGRAM(S): 510 INJECTION INTRAVENOUS at 10:15

## 2024-06-19 RX ADMIN — FERUMOXYTOL 510 MILLIGRAM(S): 510 INJECTION INTRAVENOUS at 11:00

## 2024-06-19 RX ADMIN — ONDANSETRON 8 MILLIGRAM(S): 2 INJECTION, SOLUTION INTRAMUSCULAR; INTRAVENOUS at 09:45

## 2024-06-20 DIAGNOSIS — D50.9 IRON DEFICIENCY ANEMIA, UNSPECIFIED: ICD-10-CM

## 2024-06-24 LAB
HCT VFR BLD CALC: 34.4 %
HGB BLD-MCNC: 10.6 G/DL
MCHC RBC-ENTMCNC: 21 PG
MCHC RBC-ENTMCNC: 30.8 G/DL
MCV RBC AUTO: 68.3 FL
PLATELET # BLD AUTO: 178 K/UL
PMV BLD: 10.6 FL
RBC # BLD: 5.04 M/UL
RBC # FLD: 15.8 %
VIT A SERPL-MCNC: 10.2 UG/DL
WBC # FLD AUTO: 3.04 K/UL

## 2024-06-24 NOTE — END OF VISIT
[FreeTextEntry3] : pt seen and examined. reviewed note and agree with plan above.  Iron def treated with feraheme x2. mom had voiced concerns regarding thinning hair- vitamin A level is low.  patient seeing GI for eval and will get some input from GI regarding the vitamin A def.  iron studies in 3-4 weeks.  f/u heme 6 months or sooner with any concerns

## 2024-06-24 NOTE — REVIEW OF SYSTEMS
[Decreased Appetite] : decreased appetite [Weight Change] : weight change [Pallor] : pallor [Anemia] : anemia [Emesis] : emesis [Fever] : no fever [Fatigue] : no fatigue [Weakness] : no weakness [Rash] : no rash [Petechiae] : no petechiae [Ecchymoses] : no ecchymoses [Jaundice] : no jaundice [Icterus] : no icterus [Nasal Discharge] : no nasal discharge [Epistaxis] : no epistaxis [Mouth Ulcers] : no mouth ulcers [Bleeding] : no bleeding [Bruising] : no bruising [Adenopathy] : no adenopathy [Dyspnea] : no dyspnea [Cough] : no cough [Wheezing] : no wheezing [Stridor] : no stridor [Murmur] : no murmur [Constipation] : no constipation [Diarrhea] : no diarrhea [Hematuria] : no hematuria [Joint Pain] : no joint pain [Joint Swelling] : no joint swelling [Myalgia] : no myalgia [Seizure] : no seizure [Irritable] : not irritable [de-identified] : abrasions to face, chest and arms from patient scratching himself

## 2024-06-24 NOTE — HISTORY OF PRESENT ILLNESS
[de-identified] : This is a scheduled visit for Feraheme infusion (dose 2/2) for this 22 y/o male with autism, seizures, hypothyroidism, reflux/vomiting, beta thal trait and iron deficiency anemia.  As per mom, patient continue to vomit daily.   Denies dark colored emesis.  Denies fever or recent infection.  No reports of unusual bruising or bleeding.  Diet consists of mostly junk food and notes that patient continues to lose weight.  Scheduled for endoscopy on 7/1 here at [facility].  No changes in medication regimen.

## 2024-06-24 NOTE — PHYSICAL EXAM
[Thin] : thin [Normal] : full range of motion and no deformities appreciated, no masses and normal strength in all extremities [Icterus] : not icterus [de-identified] : abrasions to face, chest and arms from patient scratching himself  [de-identified] : h/o autism, nonverbal

## 2024-07-01 ENCOUNTER — TRANSCRIPTION ENCOUNTER (OUTPATIENT)
Age: 24
End: 2024-07-01

## 2024-07-01 ENCOUNTER — OUTPATIENT (OUTPATIENT)
Dept: OUTPATIENT SERVICES | Facility: HOSPITAL | Age: 24
LOS: 1 days | Discharge: HOME | End: 2024-07-01
Payer: COMMERCIAL

## 2024-07-01 ENCOUNTER — RESULT REVIEW (OUTPATIENT)
Age: 24
End: 2024-07-01

## 2024-07-01 VITALS
HEIGHT: 69 IN | SYSTOLIC BLOOD PRESSURE: 111 MMHG | RESPIRATION RATE: 16 BRPM | HEART RATE: 91 BPM | OXYGEN SATURATION: 98 % | WEIGHT: 128.09 LBS | DIASTOLIC BLOOD PRESSURE: 70 MMHG | TEMPERATURE: 98 F

## 2024-07-01 VITALS — SYSTOLIC BLOOD PRESSURE: 95 MMHG | HEART RATE: 69 BPM | DIASTOLIC BLOOD PRESSURE: 59 MMHG | RESPIRATION RATE: 15 BRPM

## 2024-07-01 DIAGNOSIS — R62.50 UNSPECIFIED LACK OF EXPECTED NORMAL PHYSIOLOGICAL DEVELOPMENT IN CHILDHOOD: ICD-10-CM

## 2024-07-01 DIAGNOSIS — K20.0 EOSINOPHILIC ESOPHAGITIS: ICD-10-CM

## 2024-07-01 DIAGNOSIS — R11.0 NAUSEA: ICD-10-CM

## 2024-07-01 DIAGNOSIS — K29.50 UNSPECIFIED CHRONIC GASTRITIS WITHOUT BLEEDING: ICD-10-CM

## 2024-07-01 DIAGNOSIS — F84.0 AUTISTIC DISORDER: ICD-10-CM

## 2024-07-01 DIAGNOSIS — D50.9 IRON DEFICIENCY ANEMIA, UNSPECIFIED: ICD-10-CM

## 2024-07-01 DIAGNOSIS — R11.12 PROJECTILE VOMITING: ICD-10-CM

## 2024-07-01 DIAGNOSIS — E03.9 HYPOTHYROIDISM, UNSPECIFIED: ICD-10-CM

## 2024-07-01 DIAGNOSIS — K44.9 DIAPHRAGMATIC HERNIA WITHOUT OBSTRUCTION OR GANGRENE: ICD-10-CM

## 2024-07-01 DIAGNOSIS — R56.9 UNSPECIFIED CONVULSIONS: ICD-10-CM

## 2024-07-01 DIAGNOSIS — Z01.818 ENCOUNTER FOR OTHER PREPROCEDURAL EXAMINATION: Chronic | ICD-10-CM

## 2024-07-01 DIAGNOSIS — Z86.2 PERSONAL HISTORY OF DISEASES OF THE BLOOD AND BLOOD-FORMING ORGANS AND CERTAIN DISORDERS INVOLVING THE IMMUNE MECHANISM: ICD-10-CM

## 2024-07-01 PROCEDURE — 88312 SPECIAL STAINS GROUP 1: CPT

## 2024-07-01 PROCEDURE — 88305 TISSUE EXAM BY PATHOLOGIST: CPT

## 2024-07-01 PROCEDURE — 88313 SPECIAL STAINS GROUP 2: CPT

## 2024-07-01 PROCEDURE — 88313 SPECIAL STAINS GROUP 2: CPT | Mod: 26

## 2024-07-01 PROCEDURE — 88305 TISSUE EXAM BY PATHOLOGIST: CPT | Mod: 26

## 2024-07-01 PROCEDURE — 88312 SPECIAL STAINS GROUP 1: CPT | Mod: 26

## 2024-07-01 RX ORDER — LAMOTRIGINE 100 MG/1
1 TABLET ORAL
Refills: 0 | DISCHARGE

## 2024-07-01 RX ORDER — PANTOPRAZOLE SODIUM 40 MG/10ML
1 INJECTION, POWDER, FOR SOLUTION INTRAVENOUS
Refills: 0 | DISCHARGE

## 2024-07-01 RX ORDER — OMEPRAZOLE 10 MG/1
1 CAPSULE, DELAYED RELEASE ORAL
Qty: 0 | Refills: 0 | DISCHARGE

## 2024-07-01 RX ORDER — LEVOTHYROXINE SODIUM 25 MCG
1 TABLET ORAL
Refills: 0 | DISCHARGE

## 2024-07-01 RX ORDER — LAMOTRIGINE 100 MG/1
1.5 TABLET ORAL
Refills: 0 | DISCHARGE

## 2024-07-01 RX ORDER — QUETIAPINE FUMARATE 200 MG/1
1 TABLET, FILM COATED ORAL
Qty: 0 | Refills: 0 | DISCHARGE

## 2024-07-01 RX ORDER — LAMOTRIGINE 25 MG/1
1 TABLET, ORALLY DISINTEGRATING ORAL
Qty: 0 | Refills: 0 | DISCHARGE

## 2024-07-01 RX ORDER — DIVALPROEX SODIUM 500 MG/1
2 TABLET, DELAYED RELEASE ORAL
Qty: 0 | Refills: 0 | DISCHARGE

## 2024-07-01 RX ORDER — BUSPIRONE HYDROCHLORIDE 10 MG/1
1 TABLET ORAL
Refills: 0 | DISCHARGE

## 2024-07-01 RX ORDER — SULFAMETHOXAZOLE
1 POWDER (GRAM) MISCELLANEOUS
Qty: 0 | Refills: 0 | DISCHARGE

## 2024-07-01 RX ORDER — FAMOTIDINE 40 MG
1 TABLET ORAL
Refills: 0 | DISCHARGE

## 2024-07-01 RX ORDER — LEVOTHYROXINE SODIUM 125 MCG
1 TABLET ORAL
Qty: 0 | Refills: 0 | DISCHARGE

## 2024-07-02 LAB — SURGICAL PATHOLOGY STUDY: SIGNIFICANT CHANGE UP

## 2024-07-24 ENCOUNTER — RX CHANGE (OUTPATIENT)
Age: 24
End: 2024-07-24

## 2024-07-24 RX ORDER — QUETIAPINE FUMARATE 50 MG/1
50 TABLET ORAL
Qty: 630 | Refills: 2 | Status: ACTIVE | COMMUNITY
Start: 1900-01-01 | End: 1900-01-01

## 2024-08-30 RX ORDER — CLONAZEPAM 2 MG/1
2 TABLET, ORALLY DISINTEGRATING ORAL
Qty: 10 | Refills: 0 | Status: ACTIVE | COMMUNITY
Start: 2024-08-30 | End: 1900-01-01

## 2024-10-08 NOTE — ED ADULT NURSE NOTE - CAS EDP DISCH TYPE
Home Dutasteride Male Counseling: Dustasteride Counseling:  I discussed with the patient the risks of use of dutasteride including but not limited to decreased libido, decreased ejaculate volume, and gynecomastia. Women who can become pregnant should not handle medication.  All of the patient's questions and concerns were addressed.

## 2024-10-21 RX ORDER — QUETIAPINE FUMARATE 25 MG/1
25 TABLET ORAL
Qty: 30 | Refills: 5 | Status: ACTIVE | COMMUNITY
Start: 2024-10-21 | End: 1900-01-01

## 2024-10-28 NOTE — HISTORY OF PRESENT ILLNESS
[de-identified] : 19 yo male with autism, beta thal minor, hypothyroidism, and gastritis/esophagitis. He continues on ferrous sulfate bid over the last ~2 months and PPI/H2 blocker from GI.  He has had mutliple episodes of emesis since last visit with some coffee-gorund emesis.  Mother reports he may have delayed gastric emptying and is going to f/u with GI next week. [de-identified] : Hgb and mcv, ferritin trending down and abnl.  + episodes of coffee-ground emesis. negative

## 2024-11-14 ENCOUNTER — RX CHANGE (OUTPATIENT)
Age: 24
End: 2024-11-14

## 2024-11-14 RX ORDER — QUETIAPINE FUMARATE 25 MG/1
25 TABLET ORAL
Qty: 90 | Refills: 2 | Status: ACTIVE | COMMUNITY
Start: 1900-01-01 | End: 1900-01-01

## 2024-11-29 ENCOUNTER — LABORATORY RESULT (OUTPATIENT)
Age: 24
End: 2024-11-29

## 2024-11-29 ENCOUNTER — OUTPATIENT (OUTPATIENT)
Dept: OUTPATIENT SERVICES | Facility: HOSPITAL | Age: 24
LOS: 1 days | End: 2024-11-29
Payer: COMMERCIAL

## 2024-11-29 DIAGNOSIS — Z01.818 ENCOUNTER FOR OTHER PREPROCEDURAL EXAMINATION: Chronic | ICD-10-CM

## 2024-11-29 PROCEDURE — 85045 AUTOMATED RETICULOCYTE COUNT: CPT

## 2024-11-29 PROCEDURE — 82728 ASSAY OF FERRITIN: CPT

## 2024-11-29 PROCEDURE — 83550 IRON BINDING TEST: CPT

## 2024-11-29 PROCEDURE — 85027 COMPLETE CBC AUTOMATED: CPT

## 2024-11-29 PROCEDURE — 83540 ASSAY OF IRON: CPT

## 2024-11-29 PROCEDURE — 85652 RBC SED RATE AUTOMATED: CPT

## 2024-12-05 DIAGNOSIS — R11.12 PROJECTILE VOMITING: ICD-10-CM

## 2024-12-06 DIAGNOSIS — R11.12 PROJECTILE VOMITING: ICD-10-CM

## 2024-12-16 ENCOUNTER — TRANSCRIPTION ENCOUNTER (OUTPATIENT)
Age: 24
End: 2024-12-16

## 2024-12-16 ENCOUNTER — RESULT REVIEW (OUTPATIENT)
Age: 24
End: 2024-12-16

## 2024-12-16 ENCOUNTER — OUTPATIENT (OUTPATIENT)
Dept: OUTPATIENT SERVICES | Facility: HOSPITAL | Age: 24
LOS: 1 days | Discharge: HOME | End: 2024-12-16
Payer: COMMERCIAL

## 2024-12-16 VITALS
SYSTOLIC BLOOD PRESSURE: 110 MMHG | HEART RATE: 77 BPM | DIASTOLIC BLOOD PRESSURE: 74 MMHG | RESPIRATION RATE: 18 BRPM | OXYGEN SATURATION: 99 %

## 2024-12-16 VITALS
SYSTOLIC BLOOD PRESSURE: 128 MMHG | WEIGHT: 151.9 LBS | HEART RATE: 112 BPM | TEMPERATURE: 96 F | RESPIRATION RATE: 22 BRPM | DIASTOLIC BLOOD PRESSURE: 81 MMHG | HEIGHT: 69 IN

## 2024-12-16 DIAGNOSIS — Z01.818 ENCOUNTER FOR OTHER PREPROCEDURAL EXAMINATION: Chronic | ICD-10-CM

## 2024-12-16 DIAGNOSIS — D50.9 IRON DEFICIENCY ANEMIA, UNSPECIFIED: ICD-10-CM

## 2024-12-16 DIAGNOSIS — K44.9 DIAPHRAGMATIC HERNIA WITHOUT OBSTRUCTION OR GANGRENE: ICD-10-CM

## 2024-12-16 PROCEDURE — 88305 TISSUE EXAM BY PATHOLOGIST: CPT | Mod: 26

## 2024-12-16 PROCEDURE — 88305 TISSUE EXAM BY PATHOLOGIST: CPT

## 2024-12-16 NOTE — ASU PATIENT PROFILE, ADULT - FALL HARM RISK - PATIENT NEEDS ASSISTANCE
Outpatient Clinic Visit Note    Patient: Rasheeda Mar  : 1973 (51 y.o.)  Date: 2024    CC:  Chief Complaint   Patient presents with    3 Month Follow-Up    Headache    Generalized Body Aches     Multiple locations.         HPI: she is doing OK with current meds, she feels that being sick this summer really knocked her for a loop, she thinks she should have asked for some physical therapy.   She reviews that her brother with type 1 diabetes has had both legs amputated.     Past Medical History:    Past Medical History:   Diagnosis Date    Abnormal Pap smear of cervix     Anxiety     Asthma     Bronchitis     Constipation     COPD (chronic obstructive pulmonary disease) ([facility])     Depression     Hot flashes, menopausal     Insomnia     Migraine     Night sweats     Primary hypertension 2024    PTSD (post-traumatic stress disorder)     Urinary frequency     UTI (urinary tract infection)     Vaginal dryness        Past Surgical History:  Past Surgical History:   Procedure Laterality Date    COLPOSCOPY      DILATION AND CURETTAGE OF UTERUS      TUBAL LIGATION         Home Medications:  Current Outpatient Medications   Medication Sig Dispense Refill    ondansetron (ZOFRAN-ODT) 4 MG disintegrating tablet Take 1 tablet by mouth 3 times daily as needed for Nausea or Vomiting 21 tablet 0    ALPRAZolam (XANAX) 2 MG tablet Take 1 tablet by mouth 3 times daily as needed for Anxiety or Sleep for up to 120 days. Max Daily Amount: 6 mg 90 tablet 2    albuterol sulfate HFA (PROVENTIL;VENTOLIN;PROAIR) 108 (90 Base) MCG/ACT inhaler Inhale 2 puffs into the lungs every 4 hours as needed for Wheezing or Shortness of Breath 18 g 5    budesonide-formoterol (SYMBICORT) 80-4.5 MCG/ACT AERO Inhale 2 puffs into the lungs in the morning and 2 puffs in the evening. 10.2 g 5    famotidine (PEPCID) 20 MG tablet Take 1 tablet by mouth 2 times daily 60 tablet 5    ibuprofen (ADVIL;MOTRIN) 800 MG tablet Take 1 tablet by mouth 
No assistance needed

## 2024-12-16 NOTE — ASU PATIENT PROFILE, ADULT - NS PRO PASSIVE SMOKE EXP
MS is doing well  Handling Ocrevus infusion well   Would like to review th MRI results     Mentions she uploaded Lab results into the chart, [employer] message 7/17   No

## 2024-12-16 NOTE — ASU DISCHARGE PLAN (ADULT/PEDIATRIC) - FINANCIAL ASSISTANCE
[facility] provides services at a reduced cost to those who are determined to be eligible through [facility]’s financial assistance program. Information regarding [facility]’s financial assistance program can be found by going to [email or web address] or by calling [phone].

## 2024-12-16 NOTE — ASU DISCHARGE PLAN (ADULT/PEDIATRIC) - NS MD DC FALL RISK RISK
For information on Fall & Injury Prevention, visit: [email or web address] OR  [email or web address] OR  https://www.cdc.gov/steadi/patient.html

## 2024-12-17 LAB — SURGICAL PATHOLOGY STUDY: SIGNIFICANT CHANGE UP

## 2024-12-19 ENCOUNTER — APPOINTMENT (OUTPATIENT)
Dept: ENDOCRINOLOGY | Facility: CLINIC | Age: 24
End: 2024-12-19

## 2024-12-19 DIAGNOSIS — E03.9 HYPOTHYROIDISM, UNSPECIFIED: ICD-10-CM

## 2024-12-19 DIAGNOSIS — E55.9 VITAMIN D DEFICIENCY, UNSPECIFIED: ICD-10-CM

## 2024-12-19 PROCEDURE — 99441: CPT

## 2024-12-22 DIAGNOSIS — K20.80 OTHER ESOPHAGITIS WITHOUT BLEEDING: ICD-10-CM

## 2024-12-22 DIAGNOSIS — K44.9 DIAPHRAGMATIC HERNIA WITHOUT OBSTRUCTION OR GANGRENE: ICD-10-CM

## 2024-12-22 DIAGNOSIS — K20.90 ESOPHAGITIS, UNSPECIFIED WITHOUT BLEEDING: ICD-10-CM

## 2024-12-22 DIAGNOSIS — F84.0 AUTISTIC DISORDER: ICD-10-CM

## 2024-12-22 DIAGNOSIS — E03.9 HYPOTHYROIDISM, UNSPECIFIED: ICD-10-CM

## 2024-12-22 DIAGNOSIS — K21.9 GASTRO-ESOPHAGEAL REFLUX DISEASE WITHOUT ESOPHAGITIS: ICD-10-CM

## 2024-12-22 DIAGNOSIS — Z13.810 ENCOUNTER FOR SCREENING FOR UPPER GASTROINTESTINAL DISORDER: ICD-10-CM

## 2024-12-22 DIAGNOSIS — G40.909 EPILEPSY, UNSPECIFIED, NOT INTRACTABLE, WITHOUT STATUS EPILEPTICUS: ICD-10-CM

## 2024-12-22 DIAGNOSIS — K29.50 UNSPECIFIED CHRONIC GASTRITIS WITHOUT BLEEDING: ICD-10-CM

## 2025-01-17 ENCOUNTER — RX RENEWAL (OUTPATIENT)
Age: 25
End: 2025-01-17

## 2025-01-20 ENCOUNTER — NON-APPOINTMENT (OUTPATIENT)
Age: 25
End: 2025-01-20

## 2025-02-18 ENCOUNTER — RX RENEWAL (OUTPATIENT)
Age: 25
End: 2025-02-18
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
## 2025-05-31 ENCOUNTER — LABORATORY RESULT (OUTPATIENT)
Age: 25
End: 2025-05-31

## 2025-05-31 ENCOUNTER — OUTPATIENT (OUTPATIENT)
Dept: OUTPATIENT SERVICES | Facility: HOSPITAL | Age: 25
LOS: 1 days | End: 2025-05-31
Payer: COMMERCIAL

## 2025-05-31 DIAGNOSIS — Z01.818 ENCOUNTER FOR OTHER PREPROCEDURAL EXAMINATION: Chronic | ICD-10-CM

## 2025-05-31 PROCEDURE — 83540 ASSAY OF IRON: CPT

## 2025-05-31 PROCEDURE — 85045 AUTOMATED RETICULOCYTE COUNT: CPT

## 2025-05-31 PROCEDURE — 85027 COMPLETE CBC AUTOMATED: CPT

## 2025-05-31 PROCEDURE — 85652 RBC SED RATE AUTOMATED: CPT

## 2025-05-31 PROCEDURE — 82728 ASSAY OF FERRITIN: CPT

## 2025-05-31 PROCEDURE — 83550 IRON BINDING TEST: CPT

## 2025-06-16 ENCOUNTER — RX RENEWAL (OUTPATIENT)
Age: 25
End: 2025-06-16

## 2025-06-17 DIAGNOSIS — R11.12 PROJECTILE VOMITING: ICD-10-CM

## 2025-06-18 ENCOUNTER — LABORATORY RESULT (OUTPATIENT)
Age: 25
End: 2025-06-18

## 2025-06-18 ENCOUNTER — APPOINTMENT (OUTPATIENT)
Age: 25
End: 2025-06-18
Payer: COMMERCIAL

## 2025-06-18 ENCOUNTER — OUTPATIENT (OUTPATIENT)
Dept: OUTPATIENT SERVICES | Facility: HOSPITAL | Age: 25
LOS: 1 days | End: 2025-06-18
Payer: COMMERCIAL

## 2025-06-18 VITALS
HEART RATE: 116 BPM | DIASTOLIC BLOOD PRESSURE: 86 MMHG | WEIGHT: 165.24 LBS | HEIGHT: 68.9 IN | TEMPERATURE: 97.6 F | RESPIRATION RATE: 16 BRPM | SYSTOLIC BLOOD PRESSURE: 125 MMHG | OXYGEN SATURATION: 98 % | BODY MASS INDEX: 24.47 KG/M2

## 2025-06-18 VITALS
DIASTOLIC BLOOD PRESSURE: 86 MMHG | TEMPERATURE: 98 F | RESPIRATION RATE: 16 BRPM | SYSTOLIC BLOOD PRESSURE: 125 MMHG | HEART RATE: 116 BPM

## 2025-06-18 DIAGNOSIS — D50.9 IRON DEFICIENCY ANEMIA, UNSPECIFIED: ICD-10-CM

## 2025-06-18 DIAGNOSIS — R11.12 PROJECTILE VOMITING: ICD-10-CM

## 2025-06-18 DIAGNOSIS — Z01.818 ENCOUNTER FOR OTHER PREPROCEDURAL EXAMINATION: Chronic | ICD-10-CM

## 2025-06-18 PROCEDURE — 83921 ORGANIC ACID SINGLE QUANT: CPT

## 2025-06-18 PROCEDURE — 96365 THER/PROPH/DIAG IV INF INIT: CPT

## 2025-06-18 PROCEDURE — 96367 TX/PROPH/DG ADDL SEQ IV INF: CPT

## 2025-06-18 PROCEDURE — 83090 ASSAY OF HOMOCYSTEINE: CPT

## 2025-06-18 PROCEDURE — 99214 OFFICE O/P EST MOD 30 MIN: CPT

## 2025-06-18 PROCEDURE — 99214 OFFICE O/P EST MOD 30 MIN: CPT | Mod: 25

## 2025-06-18 PROCEDURE — 82607 VITAMIN B-12: CPT

## 2025-06-18 PROCEDURE — 84630 ASSAY OF ZINC: CPT

## 2025-06-18 PROCEDURE — 84591 ASSAY OF NOS VITAMIN: CPT

## 2025-06-18 PROCEDURE — 84207 ASSAY OF VITAMIN B-6: CPT

## 2025-06-18 PROCEDURE — 85025 COMPLETE CBC W/AUTO DIFF WBC: CPT

## 2025-06-18 PROCEDURE — 82306 VITAMIN D 25 HYDROXY: CPT

## 2025-06-18 PROCEDURE — 84255 ASSAY OF SELENIUM: CPT

## 2025-06-18 PROCEDURE — 82746 ASSAY OF FOLIC ACID SERUM: CPT

## 2025-06-18 PROCEDURE — 86340 INTRINSIC FACTOR ANTIBODY: CPT

## 2025-06-18 PROCEDURE — 82941 ASSAY OF GASTRIN: CPT

## 2025-06-18 PROCEDURE — 96375 TX/PRO/DX INJ NEW DRUG ADDON: CPT

## 2025-06-18 RX ORDER — ONDANSETRON HCL/PF 4 MG/2 ML
8 VIAL (ML) INJECTION ONCE
Refills: 0 | Status: COMPLETED | OUTPATIENT
Start: 2025-06-18 | End: 2025-06-18

## 2025-06-18 RX ORDER — FERUMOXYTOL 510 MG/17ML
510 INJECTION INTRAVENOUS ONCE
Refills: 0 | Status: COMPLETED | OUTPATIENT
Start: 2025-06-18 | End: 2025-06-18

## 2025-06-18 RX ORDER — DIPHENHYDRAMINE HCL 12.5MG/5ML
25 ELIXIR ORAL ONCE
Refills: 0 | Status: COMPLETED | OUTPATIENT
Start: 2025-06-18 | End: 2025-06-18

## 2025-06-18 RX ADMIN — Medication 25 MILLIGRAM(S): at 10:30

## 2025-06-18 RX ADMIN — Medication 100 MILLIGRAM(S): at 10:10

## 2025-06-18 RX ADMIN — FERUMOXYTOL 156 MILLIGRAM(S): 510 INJECTION INTRAVENOUS at 10:45

## 2025-06-18 RX ADMIN — Medication 100 MILLIGRAM(S): at 10:30

## 2025-06-18 RX ADMIN — Medication 8 MILLIGRAM(S): at 10:45

## 2025-06-18 RX ADMIN — FERUMOXYTOL 510 MILLIGRAM(S): 510 INJECTION INTRAVENOUS at 11:30

## 2025-06-19 DIAGNOSIS — D50.9 IRON DEFICIENCY ANEMIA, UNSPECIFIED: ICD-10-CM

## 2025-06-25 ENCOUNTER — APPOINTMENT (OUTPATIENT)
Age: 25
End: 2025-06-25
Payer: COMMERCIAL

## 2025-06-25 VITALS
HEART RATE: 113 BPM | RESPIRATION RATE: 16 BRPM | HEIGHT: 68.9 IN | TEMPERATURE: 98.6 F | BODY MASS INDEX: 24.23 KG/M2 | DIASTOLIC BLOOD PRESSURE: 79 MMHG | WEIGHT: 163.58 LBS | OXYGEN SATURATION: 99 % | SYSTOLIC BLOOD PRESSURE: 115 MMHG

## 2025-06-25 VITALS
RESPIRATION RATE: 16 BRPM | OXYGEN SATURATION: 99 % | DIASTOLIC BLOOD PRESSURE: 76 MMHG | TEMPERATURE: 98.2 F | SYSTOLIC BLOOD PRESSURE: 117 MMHG | HEART RATE: 107 BPM

## 2025-06-25 PROCEDURE — 99214 OFFICE O/P EST MOD 30 MIN: CPT

## 2025-06-26 LAB
25(OH)D3 SERPL-MCNC: 24 NG/ML
AUTO BASOPHILS #: 0.02 K/UL
AUTO BASOPHILS #: 0.04 K/UL
AUTO BASOPHILS %: 0.3 %
AUTO BASOPHILS %: 0.5 %
AUTO EOSINOPHILS #: 0 K/UL
AUTO EOSINOPHILS #: 0.33 K/UL
AUTO EOSINOPHILS %: 0 %
AUTO EOSINOPHILS %: 4.4 %
AUTO IMMATURE GRANULOCYTES #: 0.01 K/UL
AUTO IMMATURE GRANULOCYTES #: 0.01 K/UL
AUTO LYMPHOCYTES #: 1.17 K/UL
AUTO LYMPHOCYTES #: 1.39 K/UL
AUTO LYMPHOCYTES %: 18.5 %
AUTO LYMPHOCYTES %: 19.5 %
AUTO MONOCYTES #: 0.7 K/UL
AUTO MONOCYTES #: 0.89 K/UL
AUTO MONOCYTES %: 11.6 %
AUTO MONOCYTES %: 11.9 %
AUTO NEUTROPHILS #: 4.11 K/UL
AUTO NEUTROPHILS #: 4.85 K/UL
AUTO NEUTROPHILS %: 64.6 %
AUTO NEUTROPHILS %: 68.4 %
AUTO NRBC #: 0 K/UL
AUTO NRBC #: 0 K/UL
FOLATE SERPL-MCNC: 12.1 NG/ML
GASTRIN SERPL-MCNC: 34 PG/ML
HCT VFR BLD CALC: 29.3 %
HCT VFR BLD CALC: 31.8 %
HCYS SERPL-MCNC: 12 UMOL/L
HGB BLD-MCNC: 8.9 G/DL
HGB BLD-MCNC: 9.4 G/DL
IF BLOCK AB SER QL: 1.1 AU/ML
IMM GRANULOCYTES NFR BLD AUTO: 0.1 %
IMM GRANULOCYTES NFR BLD AUTO: 0.2 %
IMMATURE RETICULOCYTE FRACTION %: 17.5 %
MAN DIFF?: NORMAL
MAN DIFF?: NORMAL
MCHC RBC-ENTMCNC: 19.4 PG
MCHC RBC-ENTMCNC: 19.7 PG
MCHC RBC-ENTMCNC: 29.6 G/DL
MCHC RBC-ENTMCNC: 30.4 G/DL
MCV RBC AUTO: 63.8 FL
MCV RBC AUTO: 66.8 FL
METHYLMALONATE SERPL-SCNC: 143 NMOL/L
PLATELET # BLD AUTO: 241 K/UL
PLATELET # BLD AUTO: 300 K/UL
PMV BLD AUTO: 0 /100 WBCS
PMV BLD AUTO: 0 /100 WBCS
PMV BLD: 10 FL
PMV BLD: 10.1 FL
RBC # BLD: 4.59 M/UL
RBC # BLD: 4.76 M/UL
RBC # BLD: 4.76 M/UL
RBC # FLD: 16 %
RBC # FLD: 18 %
RETICS # AUTO: 2.1 %
RETICS AGGREG/RBC NFR: 98.5 K/UL
RETICULOCYTE HEMOGLOBIN EQUIVALENT: 19.9 PG
VIT B12 SERPL-MCNC: 318 PG/ML
VIT B6 SERPL-MCNC: 10.6 UG/L
WBC # FLD AUTO: 6.01 K/UL
WBC # FLD AUTO: 7.51 K/UL
ZINC SERPL-MCNC: 65 UG/DL

## 2025-07-03 ENCOUNTER — LABORATORY RESULT (OUTPATIENT)
Age: 25
End: 2025-07-03

## 2025-07-07 ENCOUNTER — APPOINTMENT (OUTPATIENT)
Dept: ENDOCRINOLOGY | Facility: CLINIC | Age: 25
End: 2025-07-07
Payer: COMMERCIAL

## 2025-07-07 PROCEDURE — 99212 OFFICE O/P EST SF 10 MIN: CPT | Mod: 95

## 2025-07-08 ENCOUNTER — APPOINTMENT (OUTPATIENT)
Dept: ENDOCRINOLOGY | Facility: CLINIC | Age: 25
End: 2025-07-08

## 2025-08-02 ENCOUNTER — LABORATORY RESULT (OUTPATIENT)
Age: 25
End: 2025-08-02

## 2025-08-06 ENCOUNTER — NON-APPOINTMENT (OUTPATIENT)
Age: 25
End: 2025-08-06

## 2025-08-18 ENCOUNTER — RX RENEWAL (OUTPATIENT)
Age: 25
End: 2025-08-18

## 2025-08-27 ENCOUNTER — NON-APPOINTMENT (OUTPATIENT)
Age: 25
End: 2025-08-27

## 2025-09-02 ENCOUNTER — RX RENEWAL (OUTPATIENT)
Age: 25
End: 2025-09-02

## 2025-09-10 ENCOUNTER — RX RENEWAL (OUTPATIENT)
Age: 25
End: 2025-09-10